# Patient Record
Sex: FEMALE | Race: WHITE | NOT HISPANIC OR LATINO | Employment: OTHER | URBAN - NONMETROPOLITAN AREA
[De-identification: names, ages, dates, MRNs, and addresses within clinical notes are randomized per-mention and may not be internally consistent; named-entity substitution may affect disease eponyms.]

---

## 2017-01-28 ENCOUNTER — TRANSCRIBE ORDERS (OUTPATIENT)
Dept: URGENT CARE | Facility: CLINIC | Age: 27
End: 2017-01-28

## 2017-01-28 DIAGNOSIS — B37.0 THRUSH, ORAL: ICD-10-CM

## 2017-01-30 ENCOUNTER — TRANSCRIBE ORDERS (OUTPATIENT)
Dept: URGENT CARE | Facility: CLINIC | Age: 27
End: 2017-01-30

## 2017-01-30 DIAGNOSIS — B37.0 THRUSH: Primary | ICD-10-CM

## 2017-04-25 PROCEDURE — 87102 FUNGUS ISOLATION CULTURE: CPT | Performed by: NURSE PRACTITIONER

## 2017-04-25 PROCEDURE — 87086 URINE CULTURE/COLONY COUNT: CPT | Performed by: NURSE PRACTITIONER

## 2017-04-25 PROCEDURE — 87220 TISSUE EXAM FOR FUNGI: CPT | Performed by: NURSE PRACTITIONER

## 2017-09-05 RX ORDER — NORGESTIMATE AND ETHINYL ESTRADIOL 0.25-0.035
1 KIT ORAL DAILY
Qty: 28 TABLET | Refills: 6 | Status: SHIPPED | OUTPATIENT
Start: 2017-09-05 | End: 2018-10-18

## 2017-09-11 RX ORDER — NORGESTIMATE AND ETHINYL ESTRADIOL 0.25-0.035
KIT ORAL
Qty: 28 TABLET | Refills: 11 | Status: SHIPPED | OUTPATIENT
Start: 2017-09-11 | End: 2018-03-22

## 2018-10-18 ENCOUNTER — PROCEDURE VISIT (OUTPATIENT)
Dept: OBSTETRICS AND GYNECOLOGY | Facility: CLINIC | Age: 28
End: 2018-10-18

## 2018-10-18 VITALS
BODY MASS INDEX: 21.29 KG/M2 | SYSTOLIC BLOOD PRESSURE: 129 MMHG | DIASTOLIC BLOOD PRESSURE: 75 MMHG | HEIGHT: 65 IN | WEIGHT: 127.8 LBS

## 2018-10-18 DIAGNOSIS — Z01.419 ENCOUNTER FOR GYNECOLOGICAL EXAMINATION WITHOUT ABNORMAL FINDING: Primary | ICD-10-CM

## 2018-10-18 DIAGNOSIS — Z30.41 SURVEILLANCE OF CONTRACEPTIVE PILL: ICD-10-CM

## 2018-10-18 DIAGNOSIS — F52.0 LACK OF LIBIDO: ICD-10-CM

## 2018-10-18 PROCEDURE — 99395 PREV VISIT EST AGE 18-39: CPT | Performed by: NURSE PRACTITIONER

## 2018-10-18 PROCEDURE — G0123 SCREEN CERV/VAG THIN LAYER: HCPCS | Performed by: NURSE PRACTITIONER

## 2018-10-18 RX ORDER — LEVONORGESTREL AND ETHINYL ESTRADIOL 0.1-0.02MG
1 KIT ORAL DAILY
Qty: 84 TABLET | Refills: 4 | Status: SHIPPED | OUTPATIENT
Start: 2018-10-18 | End: 2018-11-27 | Stop reason: ALTCHOICE

## 2018-10-18 NOTE — PROGRESS NOTES
Subjective   Jazmine Liang is a 28 y.o. Here for GYN exam and needs refills on OCPs. Has c/o zero sex drive since after she had her youngest son 3 years ago.     LMP- 3 weeks ago  Last pap- 3/16/15 no hx of abnormal  Not sure if her current OCP is the same as what she took prior to having children but wonders if it is the culprit of her lack of libido. States that she loves her  and is attracted to him but does admit that she has some normal marital resentment towards him because of household chores and caring for the children.      Gynecologic Exam   The patient's pertinent negatives include no genital itching, genital lesions, genital odor, genital rash, missed menses, pelvic pain, vaginal bleeding or vaginal discharge. Pertinent negatives include no abdominal pain, constipation, diarrhea, dysuria, headaches, nausea, rash, urgency or vomiting. She is sexually active. No, her partner does not have an STD. She uses oral contraceptives for contraception. Her menstrual history has been regular. There is no history of a  section, endometriosis, a gynecological surgery, menorrhagia, miscarriage, ovarian cysts, PID, an STD or a terminated pregnancy.       The following portions of the patient's history were reviewed and updated as appropriate: allergies, current medications, past family history, past medical history, past social history, past surgical history and problem list.    Review of Systems   Constitutional: Negative for activity change, appetite change, diaphoresis, fatigue and unexpected weight change.   Respiratory: Negative for chest tightness and shortness of breath.    Cardiovascular: Negative for chest pain, palpitations and leg swelling.   Gastrointestinal: Negative for abdominal distention, abdominal pain, blood in stool, constipation, diarrhea, nausea and vomiting.   Endocrine: Negative for cold intolerance, heat intolerance, polydipsia, polyphagia and polyuria.   Genitourinary: Negative  for difficulty urinating, dyspareunia, dysuria, genital sores, menorrhagia, menstrual problem, missed menses, pelvic pain, urgency, vaginal bleeding, vaginal discharge and vaginal pain.   Musculoskeletal: Negative for gait problem and myalgias.   Skin: Negative for color change, pallor and rash.   Neurological: Negative for dizziness, weakness, light-headedness and headaches.   Hematological: Negative for adenopathy.   Psychiatric/Behavioral: Negative for agitation, confusion, dysphoric mood, self-injury and suicidal ideas. The patient is not nervous/anxious.      Breast: no complaints    Objective   Physical Exam   Constitutional: She is oriented to person, place, and time. She appears well-developed and well-nourished.   Neck: No thyromegaly present.   Cardiovascular: Normal rate, regular rhythm, normal heart sounds and intact distal pulses.    Pulmonary/Chest: Effort normal and breath sounds normal. Right breast exhibits no inverted nipple, no mass, no nipple discharge, no skin change and no tenderness. Left breast exhibits no inverted nipple, no mass, no nipple discharge, no skin change and no tenderness. Breasts are symmetrical.   Abdominal: Soft. Bowel sounds are normal. She exhibits no distension. There is no tenderness.   Genitourinary: Vagina normal and uterus normal. No breast discharge or bleeding. There is no rash, tenderness, lesion or injury on the right labia. There is no rash, tenderness, lesion or injury on the left labia. Cervix exhibits no motion tenderness, no discharge and no friability. Right adnexum displays no mass, no tenderness and no fullness. Left adnexum displays no mass, no tenderness and no fullness.   Genitourinary Comments: Ectropian present. Pap smear obtained.    Lymphadenopathy:     She has no axillary adenopathy.        Right: No inguinal adenopathy present.        Left: No inguinal adenopathy present.   Neurological: She is alert and oriented to person, place, and time.   Skin:  Skin is warm, dry and intact.   Psychiatric: She has a normal mood and affect. Her speech is normal and behavior is normal.   Nursing note and vitals reviewed.        Assessment/Plan   Jazmine was seen today for gynecologic exam, contraception and decreased libido.    Diagnoses and all orders for this visit:    Encounter for Papanicolaou smear for cervical cancer screening  -     Liquid-based Pap Smear, Screening    Other orders  -     levonorgestrel-ethinyl estradiol (AVIANE,ALESSE,LESSINA) 0.1-20 MG-MCG per tablet; Take 1 tablet by mouth Daily.      Stop sprintec and start on Aviane. Encouraged her to speak with her  regarding sex/marital counseling, or even just start by reading the [employer] Love [employer] together. If OCP change is not helping with desire after abour 3-4 months she could try wellbutrin.

## 2018-11-01 LAB
GEN CATEG CVX/VAG CYTO-IMP: NORMAL
LAB AP CASE REPORT: NORMAL
LAB AP GYN ADDITIONAL INFORMATION: NORMAL
PATH INTERP SPEC-IMP: NORMAL
STAT OF ADQ CVX/VAG CYTO-IMP: NORMAL

## 2018-11-05 ENCOUNTER — TELEPHONE (OUTPATIENT)
Dept: OBSTETRICS AND GYNECOLOGY | Facility: CLINIC | Age: 28
End: 2018-11-05

## 2018-11-05 NOTE — TELEPHONE ENCOUNTER
----- Message from Leanna Parisi sent at 11/5/2018 11:26 AM CST -----  Contact: [phone]  Wanting pap results.

## 2018-11-27 ENCOUNTER — TELEPHONE (OUTPATIENT)
Dept: OBSTETRICS AND GYNECOLOGY | Facility: CLINIC | Age: 28
End: 2018-11-27

## 2018-11-27 RX ORDER — NORGESTIMATE AND ETHINYL ESTRADIOL 0.25-0.035
1 KIT ORAL DAILY
Qty: 28 TABLET | Refills: 12 | Status: SHIPPED | OUTPATIENT
Start: 2018-11-27 | End: 2019-11-15 | Stop reason: SDUPTHER

## 2018-11-27 NOTE — TELEPHONE ENCOUNTER
----- Message from Leanna Parisi sent at 11/26/2018  4:30 PM CST -----  Contact: [phone]  Pt said that she does not like new BC wants to change back to the old kind.  [facility]    Notified the pt that Manju Sidney d/c her current BC and changed her back to Mononessa, already sent to her pharmacy.

## 2019-06-13 PROBLEM — R53.83 FATIGUE: Status: ACTIVE | Noted: 2017-05-23

## 2019-06-13 PROBLEM — Z87.19: Status: ACTIVE | Noted: 2017-05-23

## 2019-08-01 NOTE — TELEPHONE ENCOUNTER
----- Message from Leanna Parisi sent at 11/5/2018 11:26 AM CST -----  Contact: [phone]  Wanting pap results.     Notified the pt of normal pap results.   Consent: Written consent was obtained and risks were reviewed including but not limited to scarring, infection, bleeding, scabbing, incomplete removal, nerve damage and allergy to anesthesia.

## 2019-11-15 ENCOUNTER — TELEPHONE (OUTPATIENT)
Dept: OBSTETRICS AND GYNECOLOGY | Facility: CLINIC | Age: 29
End: 2019-11-15

## 2019-11-15 RX ORDER — NORGESTIMATE AND ETHINYL ESTRADIOL 0.25-0.035
1 KIT ORAL DAILY
Qty: 28 TABLET | Refills: 12 | Status: SHIPPED | OUTPATIENT
Start: 2019-11-15 | End: 2019-11-18 | Stop reason: SDUPTHER

## 2019-11-18 ENCOUNTER — TELEPHONE (OUTPATIENT)
Dept: OBSTETRICS AND GYNECOLOGY | Facility: CLINIC | Age: 29
End: 2019-11-18

## 2019-11-18 RX ORDER — NORGESTIMATE AND ETHINYL ESTRADIOL 0.25-0.035
1 KIT ORAL DAILY
Qty: 28 TABLET | Refills: 12 | Status: SHIPPED | OUTPATIENT
Start: 2019-11-18 | End: 2020-10-15 | Stop reason: SDUPTHER

## 2020-04-25 PROBLEM — E16.2 HYPOGLYCEMIA: Status: ACTIVE | Noted: 2020-02-28

## 2020-10-15 ENCOUNTER — PROCEDURE VISIT (OUTPATIENT)
Dept: OBSTETRICS AND GYNECOLOGY | Facility: CLINIC | Age: 30
End: 2020-10-15

## 2020-10-15 VITALS
BODY MASS INDEX: 21.83 KG/M2 | HEIGHT: 65 IN | SYSTOLIC BLOOD PRESSURE: 98 MMHG | WEIGHT: 131 LBS | DIASTOLIC BLOOD PRESSURE: 60 MMHG

## 2020-10-15 DIAGNOSIS — Z30.41 SURVEILLANCE OF CONTRACEPTIVE PILL: Primary | ICD-10-CM

## 2020-10-15 PROCEDURE — 99213 OFFICE O/P EST LOW 20 MIN: CPT | Performed by: NURSE PRACTITIONER

## 2020-10-15 RX ORDER — NORGESTIMATE AND ETHINYL ESTRADIOL 0.25-0.035
1 KIT ORAL DAILY
Qty: 84 TABLET | Refills: 4 | Status: SHIPPED | OUTPATIENT
Start: 2020-10-15 | End: 2021-01-26

## 2020-10-15 NOTE — PROGRESS NOTES
Shirley Liang is a 30 y.o. presents for birth control refills. No concerns at this time.    LMP- 10/13  Last pap- 10/18/18 NIL    Gynecologic Exam  The patient's pertinent negatives include no genital itching, genital lesions, genital odor, genital rash, missed menses, pelvic pain, vaginal bleeding or vaginal discharge. Pertinent negatives include no abdominal pain, constipation, diarrhea or dysuria. She is sexually active. No, her partner does not have an STD. She uses oral contraceptives for contraception. Her menstrual history has been regular.       The following portions of the patient's history were reviewed and updated as appropriate: allergies, current medications, past family history, past medical history, past social history, past surgical history and problem list.    Review of Systems   Constitutional: Negative for activity change, appetite change, diaphoresis, fatigue, unexpected weight gain and unexpected weight loss.   Respiratory: Negative for chest tightness and shortness of breath.    Cardiovascular: Negative for chest pain and palpitations.   Gastrointestinal: Negative for abdominal distention, abdominal pain, constipation and diarrhea.   Endocrine: Negative.    Genitourinary: Negative for amenorrhea, breast discharge, breast lump, breast pain, decreased libido, dyspareunia, dysuria, menstrual problem, missed menses, pelvic pain, vaginal bleeding, vaginal discharge and vaginal pain.   Musculoskeletal: Negative for myalgias.   Skin: Negative for color change, dry skin and skin lesions.   Neurological: Negative for light-headedness and headache.   Psychiatric/Behavioral: Negative for agitation, dysphoric mood, sleep disturbance, depressed mood and stress. The patient is not nervous/anxious.        Objective   Physical Exam  Vitals signs and nursing note reviewed.   Constitutional:       General: She is awake. She is not in acute distress.     Appearance: Normal appearance. She is  well-developed and normal weight. She is not ill-appearing, toxic-appearing or diaphoretic.   Cardiovascular:      Rate and Rhythm: Normal rate and regular rhythm.      Heart sounds: Normal heart sounds.   Pulmonary:      Effort: Pulmonary effort is normal.      Breath sounds: Normal breath sounds.   Genitourinary:     Comments: Pelvic exam deferred.  Skin:     General: Skin is warm and dry.   Neurological:      Mental Status: She is alert and oriented to person, place, and time.   Psychiatric:         Behavior: Behavior normal. Behavior is cooperative.           Assessment/Plan   Diagnoses and all orders for this visit:    1. Surveillance of contraceptive pill (Primary)    Other orders  -     norgestimate-ethinyl estradiol (ORTHO-CYCLEN) 0.25-35 MG-MCG per tablet; Take 1 tablet by mouth Daily.  Dispense: 84 tablet; Refill: 4      RBA and potential s/e of OCPs reviewed.

## 2021-01-26 ENCOUNTER — OFFICE VISIT (OUTPATIENT)
Dept: OBSTETRICS AND GYNECOLOGY | Facility: CLINIC | Age: 31
End: 2021-01-26

## 2021-01-26 VITALS
SYSTOLIC BLOOD PRESSURE: 120 MMHG | DIASTOLIC BLOOD PRESSURE: 72 MMHG | BODY MASS INDEX: 21.83 KG/M2 | WEIGHT: 131 LBS | HEIGHT: 65 IN

## 2021-01-26 DIAGNOSIS — N89.8 VAGINAL DISCHARGE: Primary | ICD-10-CM

## 2021-01-26 DIAGNOSIS — Z30.41 SURVEILLANCE OF CONTRACEPTIVE PILL: ICD-10-CM

## 2021-01-26 PROCEDURE — 99214 OFFICE O/P EST MOD 30 MIN: CPT | Performed by: NURSE PRACTITIONER

## 2021-01-26 RX ORDER — LEVONORGESTREL AND ETHINYL ESTRADIOL 0.1-0.02MG
1 KIT ORAL DAILY
Qty: 84 TABLET | Refills: 4 | Status: SHIPPED | OUTPATIENT
Start: 2021-01-26 | End: 2021-09-30

## 2021-01-26 NOTE — PROGRESS NOTES
Subjective   Chief Complaint   Patient presents with   • Vaginal Discharge     Jazmine Liang is a 30 y.o. year old  presenting to be seen for evaluation of an abnormal vaginal discharge. The discharge is mucousy and bloody.  Her symptoms have been present for 3 month(s).  Additional she has noticed no other symptoms. Denies dysuria, vaginal pain or swelling, itching, burning, odor or painful intercourse..    She is sexually active.  In the past 12 months there has not been new sexual partners.  Condoms are not typically used.  She would like to be screened for STD's at today's exam.     Prior to the onset of symptoms she was not on systemic antibiotics.  She has not recently changed soaps/detergents/toilet tissue.  Prior to this visit, she has used nothing in an attempt to improve her symptoms.    Patient's last menstrual period was 2020 (approximate).    Current birth control method: OCP (estrogen/progesterone). Switched from Aviane to Sprintec about 2 years ago because her sex drive was a bit lower and she was nash. States that the Sprintec didn't really help her sex drive but it did improve her mood. Considering switching back just to help with libido.    No Additional Complaints Reported    The following portions of the patient's history were reviewed and updated as appropriate:problem list, current medications and allergies    Review of Systems   Constitutional: Negative for activity change, appetite change, chills, diaphoresis, fatigue, fever, unexpected weight gain and unexpected weight loss.   Respiratory: Negative for chest tightness and shortness of breath.    Cardiovascular: Negative for chest pain and palpitations.   Endocrine: Negative.    Genitourinary: Positive for vaginal discharge. Negative for amenorrhea, breast discharge, breast lump, breast pain, decreased libido, difficulty urinating, dyspareunia, dysuria, frequency, menstrual problem, pelvic pain, pelvic pressure, urgency,  "urinary incontinence, vaginal bleeding and vaginal pain.   Skin: Negative for color change, dry skin and skin lesions.   Neurological: Negative for light-headedness and headache.   Psychiatric/Behavioral: Negative for agitation, dysphoric mood, sleep disturbance, depressed mood and stress. The patient is not nervous/anxious.         Objective   /72   Ht 165.1 cm (65\")   Wt 59.4 kg (131 lb)   LMP 12/29/2020 (Approximate)   Breastfeeding No   BMI 21.80 kg/m²     General:  well developed; well nourished  no acute distress  appears stated age   Skin:  Not performed.   Pelvis: Clinical staff was present for exam  External genitalia:  normal appearance of the external genitalia including Bartholin's and [place]'s glands.  :  urethral meatus normal; urethral hypermobility is absent.  Vaginal:  normal pink mucosa without prolapse or lesions. discharge present -  mucousy and white;  Cervix:  normal appearance. friable; cervical motion tenderness is absent; ectropian present;     Lab Review   Last pap- 10/2018 NIL    Imaging   No data reviewed         Diagnoses and all orders for this visit:    Vaginal discharge  -     OneSwab - Kit, Vagina; Future    Surveillance of contraceptive pill    Other orders  -     levonorgestrel-ethinyl estradiol (AVIANE,ALESSE,LESSINA) 0.1-20 MG-MCG per tablet; Take 1 tablet by mouth Daily.        New Medications Ordered This Visit   Medications   • levonorgestrel-ethinyl estradiol (AVIANE,ALESSE,LESSINA) 0.1-20 MG-MCG per tablet     Sig: Take 1 tablet by mouth Daily.     Dispense:  84 tablet     Refill:  4     One swab collected; will call with results when available. Stop Sprintec and restart on Aviane; RBA and potential s/e reviewed. RTC for pap when due.    This note was electronically signed.    Manju Little, CLAU  January 26, 2021  "

## 2021-02-02 RX ORDER — AMOXICILLIN AND CLAVULANATE POTASSIUM 875; 125 MG/1; MG/1
1 TABLET, FILM COATED ORAL EVERY 12 HOURS
Qty: 14 TABLET | Refills: 0 | Status: SHIPPED | OUTPATIENT
Start: 2021-02-02 | End: 2021-02-09

## 2021-02-02 RX ORDER — FLUCONAZOLE 150 MG/1
TABLET ORAL
Qty: 2 TABLET | Refills: 0 | OUTPATIENT
Start: 2021-02-02 | End: 2021-03-01

## 2021-02-03 DIAGNOSIS — N89.8 VAGINAL DISCHARGE: ICD-10-CM

## 2021-03-11 PROCEDURE — 87086 URINE CULTURE/COLONY COUNT: CPT | Performed by: NURSE PRACTITIONER

## 2021-03-16 PROCEDURE — 87086 URINE CULTURE/COLONY COUNT: CPT | Performed by: NURSE PRACTITIONER

## 2021-03-16 PROCEDURE — 87147 CULTURE TYPE IMMUNOLOGIC: CPT | Performed by: NURSE PRACTITIONER

## 2021-03-17 ENCOUNTER — TRANSCRIBE ORDERS (OUTPATIENT)
Dept: URGENT CARE | Facility: CLINIC | Age: 31
End: 2021-03-17

## 2021-03-17 DIAGNOSIS — N39.0 ACUTE UTI: Primary | ICD-10-CM

## 2021-03-17 RX ORDER — PENICILLIN V POTASSIUM 500 MG/1
500 TABLET ORAL 3 TIMES DAILY
Qty: 30 TABLET | Refills: 0 | Status: SHIPPED | OUTPATIENT
Start: 2021-03-17 | End: 2021-03-27

## 2021-09-30 ENCOUNTER — OFFICE VISIT (OUTPATIENT)
Dept: OBSTETRICS AND GYNECOLOGY | Facility: CLINIC | Age: 31
End: 2021-09-30

## 2021-09-30 VITALS
HEIGHT: 65 IN | BODY MASS INDEX: 22.99 KG/M2 | WEIGHT: 138 LBS | SYSTOLIC BLOOD PRESSURE: 118 MMHG | DIASTOLIC BLOOD PRESSURE: 70 MMHG

## 2021-09-30 DIAGNOSIS — N92.1 BREAKTHROUGH BLEEDING ON BIRTH CONTROL PILLS: Primary | ICD-10-CM

## 2021-09-30 DIAGNOSIS — F52.0 LACK OF LIBIDO: ICD-10-CM

## 2021-09-30 DIAGNOSIS — Z30.41 SURVEILLANCE OF CONTRACEPTIVE PILL: ICD-10-CM

## 2021-09-30 DIAGNOSIS — R10.2 PELVIC PAIN: ICD-10-CM

## 2021-09-30 PROCEDURE — 99213 OFFICE O/P EST LOW 20 MIN: CPT | Performed by: NURSE PRACTITIONER

## 2021-09-30 RX ORDER — NORETHINDRONE ACETATE AND ETHINYL ESTRADIOL AND FERROUS FUMARATE 5-7-9-7
1 KIT ORAL DAILY
Qty: 28 TABLET | Refills: 12 | Status: SHIPPED | OUTPATIENT
Start: 2021-09-30 | End: 2022-03-14 | Stop reason: HOSPADM

## 2021-09-30 NOTE — PROGRESS NOTES
Subjective   Jazmine Liang is a 31 y.o. here for pelvic pain and irregular bleeding.     Patient reports she is having left lower pelvic pain that radiates to her left lower back and her left abdomen. The pain happens 2 weeks after her 7 day long period, and has been occurring monthly for one year. She also has bleeding with the pain that lasts three days, and she fills a pantyliner once a day for those 3 days. She states that she has also had low sex drive for 2-3 years. She is currently taking Aviane. She has tried to treat the pain with ibuprofen 400 mg with little relief. No other symptoms at this time.     LMP: 9/1/21    Menstrual Problem  This is a recurrent problem. The current episode started more than 1 month ago. The problem occurs intermittently. The problem has been unchanged. Pertinent negatives include no abdominal pain, chest pain, diaphoresis, fatigue or myalgias. Nothing aggravates the symptoms. She has tried NSAIDs for the symptoms. The treatment provided mild relief.   Pelvic Pain  The patient's primary symptoms include pelvic pain. The patient's pertinent negatives include no vaginal discharge. Pertinent negatives include no abdominal pain, constipation, diarrhea or dysuria.       The following portions of the patient's history were reviewed and updated as appropriate: allergies, current medications, past family history, past medical history, past social history, past surgical history and problem list.    Review of Systems   Constitutional: Negative for activity change, appetite change, diaphoresis, fatigue, unexpected weight gain and unexpected weight loss.   Respiratory: Negative for chest tightness and shortness of breath.    Cardiovascular: Negative for chest pain and palpitations.   Gastrointestinal: Negative for abdominal distention, abdominal pain, constipation and diarrhea.   Genitourinary: Positive for decreased libido, menstrual problem, pelvic pain and vaginal bleeding. Negative for  amenorrhea, breast discharge, breast lump, breast pain, dyspareunia, dysuria, vaginal discharge and vaginal pain.   Musculoskeletal: Negative for myalgias.   Skin: Negative for color change, dry skin and skin lesions.   Neurological: Negative for light-headedness and headache.   Psychiatric/Behavioral: Negative for agitation, dysphoric mood, sleep disturbance, depressed mood and stress. The patient is not nervous/anxious.        Objective   Physical Exam  Vitals and nursing note reviewed. Exam conducted with a chaperone present.   Constitutional:       General: She is awake. She is not in acute distress.     Appearance: Normal appearance. She is well-developed and well-groomed. She is not ill-appearing, toxic-appearing or diaphoretic.   Neck:      Thyroid: No thyroid mass, thyromegaly or thyroid tenderness.   Cardiovascular:      Rate and Rhythm: Normal rate and regular rhythm.      Heart sounds: Normal heart sounds.   Pulmonary:      Effort: Pulmonary effort is normal.      Breath sounds: Normal breath sounds.   Abdominal:      General: Bowel sounds are normal. There is no distension.      Palpations: Abdomen is soft.      Tenderness: There is abdominal tenderness in the left lower quadrant. There is no right CVA tenderness, left CVA tenderness or guarding. Negative signs include McBurney's sign.      Hernia: There is no hernia in the left inguinal area.   Genitourinary:     General: Normal vulva.      Exam position: Lithotomy position.      Vasyl stage (genital): 5.      Labia:         Right: No rash, tenderness, lesion or injury.         Left: Tenderness present. No rash, lesion or injury.       Urethra: No prolapse, urethral pain, urethral swelling or urethral lesion.      Vagina: Normal.      Cervix: Normal.      Uterus: Normal.       Adnexa: Right adnexa normal and left adnexa normal.        Right: No mass, tenderness or fullness.          Left: No mass, tenderness or fullness.        Comments: Bimanual exam:  no ovaries felt, uterus midline, non-tender.   Lymphadenopathy:      Lower Body: No right inguinal adenopathy. No left inguinal adenopathy.   Skin:     General: Skin is warm and dry.   Neurological:      Mental Status: She is alert and oriented to person, place, and time.      Gait: Gait is intact.   Psychiatric:         Attention and Perception: Attention and perception normal.         Mood and Affect: Mood and affect normal.         Speech: Speech normal.         Behavior: Behavior normal. Behavior is cooperative.           Assessment/Plan   Diagnoses and all orders for this visit:    1. Breakthrough bleeding on birth control pills (Primary)    2. Surveillance of contraceptive pill    3. Lack of libido    4. Pelvic pain    Other orders  -     norethindrone-ethinyl estradiol-iron (ESTROSTEP FE) 1-20/1-30/1-35 MG-MCG tablet; Take 1 tablet by mouth Daily.  Dispense: 28 tablet; Refill: 12    Education on new contraception provided. RTC in 3 months if symptoms do not improve or worsen.

## 2022-03-14 ENCOUNTER — LAB (OUTPATIENT)
Dept: LAB | Facility: HOSPITAL | Age: 32
End: 2022-03-14

## 2022-03-14 ENCOUNTER — INITIAL PRENATAL (OUTPATIENT)
Dept: OBSTETRICS AND GYNECOLOGY | Facility: CLINIC | Age: 32
End: 2022-03-14

## 2022-03-14 VITALS — BODY MASS INDEX: 22.63 KG/M2 | WEIGHT: 136 LBS | DIASTOLIC BLOOD PRESSURE: 68 MMHG | SYSTOLIC BLOOD PRESSURE: 110 MMHG

## 2022-03-14 DIAGNOSIS — R76.8 ANTI-DUFFY ANTIBODIES PRESENT: Primary | ICD-10-CM

## 2022-03-14 DIAGNOSIS — Z36.87 UNSURE OF LMP (LAST MENSTRUAL PERIOD) AS REASON FOR ULTRASOUND SCAN: ICD-10-CM

## 2022-03-14 DIAGNOSIS — Z32.00 PREGNANCY EXAMINATION OR TEST, PREGNANCY UNCONFIRMED: ICD-10-CM

## 2022-03-14 DIAGNOSIS — Z34.80 SUPERVISION OF OTHER NORMAL PREGNANCY: Primary | ICD-10-CM

## 2022-03-14 DIAGNOSIS — Z32.01 POSITIVE PREGNANCY TEST: ICD-10-CM

## 2022-03-14 DIAGNOSIS — O36.80X0 ENCOUNTER TO DETERMINE FETAL VIABILITY OF PREGNANCY, SINGLE OR UNSPECIFIED FETUS: ICD-10-CM

## 2022-03-14 LAB
A AB TITR SERPL: NORMAL {TITER}
A AB TITR SERPL: NORMAL {TITER}
ABO GROUP BLD: NORMAL
AMPHET+METHAMPHET UR QL: NEGATIVE
AMPHETAMINES UR QL: NEGATIVE
ANTI-E: NORMAL
ANTI-FYB: NORMAL
B-HCG UR QL: POSITIVE
BARBITURATES UR QL SCN: NEGATIVE
BASOPHILS # BLD AUTO: 0.02 10*3/MM3 (ref 0–0.2)
BASOPHILS NFR BLD AUTO: 0.3 % (ref 0–1.5)
BENZODIAZ UR QL SCN: NEGATIVE
BILIRUB UR QL STRIP: NEGATIVE
BLD GP AB SCN SERPL QL: POSITIVE
BUPRENORPHINE SERPL-MCNC: NEGATIVE NG/ML
CANNABINOIDS SERPL QL: NEGATIVE
CLARITY UR: ABNORMAL
COCAINE UR QL: NEGATIVE
COLOR UR: YELLOW
DEPRECATED RDW RBC AUTO: 42.6 FL (ref 37–54)
EOSINOPHIL # BLD AUTO: 0.02 10*3/MM3 (ref 0–0.4)
EOSINOPHIL NFR BLD AUTO: 0.3 % (ref 0.3–6.2)
ERYTHROCYTE [DISTWIDTH] IN BLOOD BY AUTOMATED COUNT: 12.6 % (ref 12.3–15.4)
EXPIRATION DATE: ABNORMAL
GLUCOSE UR STRIP-MCNC: NEGATIVE MG/DL
HBV SURFACE AG SERPL QL IA: NORMAL
HCG INTACT+B SERPL-ACNC: NORMAL MIU/ML
HCT VFR BLD AUTO: 39.9 % (ref 34–46.6)
HCV AB SER DONR QL: NORMAL
HGB BLD-MCNC: 13.6 G/DL (ref 12–15.9)
HGB UR QL STRIP.AUTO: ABNORMAL
HIV1+2 AB SER QL: NORMAL
IMM GRANULOCYTES # BLD AUTO: 0.02 10*3/MM3 (ref 0–0.05)
IMM GRANULOCYTES NFR BLD AUTO: 0.3 % (ref 0–0.5)
INTERNAL NEGATIVE CONTROL: NEGATIVE
INTERNAL POSITIVE CONTROL: POSITIVE
KETONES UR QL STRIP: NEGATIVE
LEUKOCYTE ESTERASE UR QL STRIP.AUTO: ABNORMAL
LYMPHOCYTES # BLD AUTO: 1.24 10*3/MM3 (ref 0.7–3.1)
LYMPHOCYTES NFR BLD AUTO: 16.6 % (ref 19.6–45.3)
Lab: ABNORMAL
Lab: NORMAL
MCH RBC QN AUTO: 31.5 PG (ref 26.6–33)
MCHC RBC AUTO-ENTMCNC: 34.1 G/DL (ref 31.5–35.7)
MCV RBC AUTO: 92.4 FL (ref 79–97)
METHADONE UR QL SCN: NEGATIVE
MONOCYTES # BLD AUTO: 0.54 10*3/MM3 (ref 0.1–0.9)
MONOCYTES NFR BLD AUTO: 7.2 % (ref 5–12)
NEUTROPHILS NFR BLD AUTO: 5.65 10*3/MM3 (ref 1.7–7)
NEUTROPHILS NFR BLD AUTO: 75.3 % (ref 42.7–76)
NITRITE UR QL STRIP: NEGATIVE
NRBC BLD AUTO-RTO: 0 /100 WBC (ref 0–0.2)
OPIATES UR QL: NEGATIVE
OXYCODONE UR QL SCN: NEGATIVE
PCP UR QL SCN: NEGATIVE
PH UR STRIP.AUTO: 6.5 [PH] (ref 5–8)
PLATELET # BLD AUTO: 256 10*3/MM3 (ref 140–450)
PMV BLD AUTO: 9.4 FL (ref 6–12)
PROPOXYPH UR QL: NEGATIVE
PROT UR QL STRIP: ABNORMAL
RBC # BLD AUTO: 4.32 10*6/MM3 (ref 3.77–5.28)
RH BLD: POSITIVE
RPR SER QL: NORMAL
SP GR UR STRIP: 1.03 (ref 1–1.03)
TRICYCLICS UR QL SCN: NEGATIVE
UROBILINOGEN UR QL STRIP: ABNORMAL
WBC NRBC COR # BLD: 7.49 10*3/MM3 (ref 3.4–10.8)

## 2022-03-14 PROCEDURE — 36415 COLL VENOUS BLD VENIPUNCTURE: CPT

## 2022-03-14 PROCEDURE — 87086 URINE CULTURE/COLONY COUNT: CPT | Performed by: OBSTETRICS & GYNECOLOGY

## 2022-03-14 PROCEDURE — 87147 CULTURE TYPE IMMUNOLOGIC: CPT | Performed by: OBSTETRICS & GYNECOLOGY

## 2022-03-14 PROCEDURE — 86886 COOMBS TEST INDIRECT TITER: CPT | Performed by: OBSTETRICS & GYNECOLOGY

## 2022-03-14 PROCEDURE — 86870 RBC ANTIBODY IDENTIFICATION: CPT | Performed by: OBSTETRICS & GYNECOLOGY

## 2022-03-14 PROCEDURE — 80306 DRUG TEST PRSMV INSTRMNT: CPT | Performed by: OBSTETRICS & GYNECOLOGY

## 2022-03-14 PROCEDURE — 81003 URINALYSIS AUTO W/O SCOPE: CPT | Performed by: OBSTETRICS & GYNECOLOGY

## 2022-03-14 PROCEDURE — 87491 CHLMYD TRACH DNA AMP PROBE: CPT | Performed by: OBSTETRICS & GYNECOLOGY

## 2022-03-14 PROCEDURE — 86803 HEPATITIS C AB TEST: CPT | Performed by: OBSTETRICS & GYNECOLOGY

## 2022-03-14 PROCEDURE — 81025 URINE PREGNANCY TEST: CPT | Performed by: OBSTETRICS & GYNECOLOGY

## 2022-03-14 PROCEDURE — 86905 BLOOD TYPING RBC ANTIGENS: CPT

## 2022-03-14 PROCEDURE — 87591 N.GONORRHOEAE DNA AMP PROB: CPT | Performed by: OBSTETRICS & GYNECOLOGY

## 2022-03-14 PROCEDURE — 80081 OBSTETRIC PANEL INC HIV TSTG: CPT | Performed by: OBSTETRICS & GYNECOLOGY

## 2022-03-14 PROCEDURE — 87661 TRICHOMONAS VAGINALIS AMPLIF: CPT | Performed by: OBSTETRICS & GYNECOLOGY

## 2022-03-14 PROCEDURE — 84702 CHORIONIC GONADOTROPIN TEST: CPT | Performed by: OBSTETRICS & GYNECOLOGY

## 2022-03-14 RX ORDER — ONDANSETRON 4 MG/1
4 TABLET, ORALLY DISINTEGRATING ORAL EVERY 8 HOURS PRN
Qty: 30 TABLET | Refills: 3 | Status: SHIPPED | OUTPATIENT
Start: 2022-03-14 | End: 2022-08-18

## 2022-03-14 RX ORDER — PRENATAL VIT NO.126/IRON/FOLIC 28MG-0.8MG
TABLET ORAL DAILY
COMMUNITY
End: 2022-12-12

## 2022-03-14 NOTE — PROGRESS NOTES
I spent approximately 40 minutes with the patient acquiring the health and history intake and discussing topics related to healthy lifestyle. Her UPT is positive in the office today.  Her LMP is approximately 1/24/22. This is her 3rd pregnancy. She had a vaginal delivery with her son on 9/25/13 at 39 weeks and 6 days. He was delivered by Dr. Mayo. Her second son was delivered by Dr. Sykes on 10/29/15. The pregnancy was complicated by GBS positive and multiple maternal antibodies (most significant being E and FYB according to the delivery note).   She has history of blood transfusion, postpartum hemorrhage, and postpartum depression. She denies any health problems. She filled out the depression screening questionnaire and scored 2. She denies any use of tobacco, alcohol, or drug use. She is having problems with nausea and vomiting. We discussed her trying Vitamin B6 and Unisom. A prescription for Zofran is also sent to her pharmacy.   A newob bag is given. The 1st trimester teaching was done with the patient. We discussed a healthy diet and exercise and what is recommended. She is taking a prenatal vitamin. We also discussed Listeriosis and Toxoplasmosis. She does not eat fish. I informed patient not to be in hot tubs, saunas, or tanning beds. I encouraged her to make an appointment with the dentist if she has not had a dental exam and cleaning in the last 6 months. She had a dental appointment about 3 months ago. She plans to formula feed. I encouraged the patient to get the TDAP vaccine in the 3rd trimester.  I discussed with the patient that a pediatrician needs to be chosen prior to delivery for the infant to have an appointment scheduled before leaving the hospital. Her children see Radha OLGUIN. I discussed lab tests will be done today. Her last pap smear on file is negative on 10/18/18.  All questions were answered at this time. She is scheduled for an ultrasound and to see Delmi OLGUIN  on 3/23/22.

## 2022-03-15 ENCOUNTER — TELEPHONE (OUTPATIENT)
Dept: OBSTETRICS AND GYNECOLOGY | Facility: CLINIC | Age: 32
End: 2022-03-15

## 2022-03-15 LAB
BACTERIA SPEC AEROBE CULT: ABNORMAL
C TRACH RRNA CVX QL NAA+PROBE: NEGATIVE
N GONORRHOEA RRNA SPEC QL NAA+PROBE: NEGATIVE
RUBV IGG SERPL IA-ACNC: 4.87 INDEX
TRICHOMONAS VAGINALIS PCR: NEGATIVE

## 2022-03-15 RX ORDER — NITROFURANTOIN 25; 75 MG/1; MG/1
100 CAPSULE ORAL 2 TIMES DAILY
Qty: 14 CAPSULE | Refills: 0 | Status: SHIPPED | OUTPATIENT
Start: 2022-03-15 | End: 2022-03-23

## 2022-03-15 NOTE — TELEPHONE ENCOUNTER
----- Message from Diana Obrien MD sent at 3/15/2022  1:05 PM CDT -----  Please let her know that urine culture shows GBS. Will treat now with ABX and will need ABX in labor. Since allergic to cephalosporins, will do Macrobid.

## 2022-03-17 ENCOUNTER — TELEPHONE (OUTPATIENT)
Dept: OBSTETRICS AND GYNECOLOGY | Facility: CLINIC | Age: 32
End: 2022-03-17

## 2022-03-17 NOTE — TELEPHONE ENCOUNTER
Called and spoke with patient regarding results and dr vasquez recommendation for referral to [facility].    Patient verbalized understanding and did not have any questions or concerns.  Faxing patient information and referral

## 2022-03-17 NOTE — TELEPHONE ENCOUNTER
----- Message from Diana Obrien MD sent at 3/17/2022  9:40 AM CDT -----  Please let her know that she has antibodies just like her last pregnancy but they higher. Recommend MFM consult at 18 weeks w/ anatomy US and starting weekly US to check the blood flow since the baby can become anemic because of the antibodies and we can check for that by checking the blood flow in the baby's brain. Please place referral to [facility] in [place] for 18 weeks w/ anatomy US and MCA dopplers.

## 2022-03-23 ENCOUNTER — INITIAL PRENATAL (OUTPATIENT)
Dept: OBSTETRICS AND GYNECOLOGY | Facility: CLINIC | Age: 32
End: 2022-03-23

## 2022-03-23 VITALS — DIASTOLIC BLOOD PRESSURE: 66 MMHG | WEIGHT: 135 LBS | SYSTOLIC BLOOD PRESSURE: 112 MMHG | BODY MASS INDEX: 22.47 KG/M2

## 2022-03-23 DIAGNOSIS — O23.41 GBS (GROUP B STREPTOCOCCUS) UTI COMPLICATING PREGNANCY, FIRST TRIMESTER: ICD-10-CM

## 2022-03-23 DIAGNOSIS — O21.9 NAUSEA AND VOMITING DURING PREGNANCY PRIOR TO 22 WEEKS GESTATION: ICD-10-CM

## 2022-03-23 DIAGNOSIS — O36.0911 RH ALLOIMMUNIZATION, MATERNAL, ANTEPARTUM, FIRST TRIMESTER, FETUS 1: ICD-10-CM

## 2022-03-23 DIAGNOSIS — O09.91 HIGH-RISK PREGNANCY IN FIRST TRIMESTER: ICD-10-CM

## 2022-03-23 DIAGNOSIS — Z3A.09 9 WEEKS GESTATION OF PREGNANCY: Primary | ICD-10-CM

## 2022-03-23 DIAGNOSIS — B95.1 GBS (GROUP B STREPTOCOCCUS) UTI COMPLICATING PREGNANCY, FIRST TRIMESTER: ICD-10-CM

## 2022-03-23 PROBLEM — Z30.41 SURVEILLANCE OF CONTRACEPTIVE PILL: Status: RESOLVED | Noted: 2018-10-18 | Resolved: 2022-03-23

## 2022-03-23 PROBLEM — Z87.19: Status: RESOLVED | Noted: 2017-05-23 | Resolved: 2022-03-23

## 2022-03-23 PROBLEM — R53.83 FATIGUE: Status: RESOLVED | Noted: 2017-05-23 | Resolved: 2022-03-23

## 2022-03-23 PROBLEM — N92.1 BREAKTHROUGH BLEEDING ON BIRTH CONTROL PILLS: Status: RESOLVED | Noted: 2021-09-30 | Resolved: 2022-03-23

## 2022-03-23 PROBLEM — F52.0 LACK OF LIBIDO: Status: RESOLVED | Noted: 2018-10-18 | Resolved: 2022-03-23

## 2022-03-23 PROCEDURE — 0501F PRENATAL FLOW SHEET: CPT | Performed by: NURSE PRACTITIONER

## 2022-03-23 NOTE — PROGRESS NOTES
[facility]  Obstetrics  Date of Service: 2022    CHIEF COMPLAINT:  New prenatal visit    HISTORY OF PRESENT ILLNESS:  Jazmine Liang is a 31 y.o. y/o  at 9w0d by LMP (Patient's last menstrual period was 2022 (approximate).  The patient is supported by her  today.  Reports  nausea with occasional vomiting.   She denies any vaginal bleeding.  She has started taking a prenatal gummy vitamin.    REVIEW OF SYSTEMS  Review of Systems   Constitutional: Negative for activity change, appetite change, diaphoresis, fatigue, unexpected weight gain and unexpected weight loss.   Respiratory: Negative for chest tightness and shortness of breath.    Cardiovascular: Negative for chest pain and palpitations.   Gastrointestinal: Positive for nausea and vomiting. Negative for abdominal distention, abdominal pain, constipation and diarrhea.   Genitourinary: Negative for amenorrhea, breast discharge, breast lump, breast pain, decreased libido, decreased urine volume, difficulty urinating, dyspareunia, dysuria, flank pain, frequency, genital sores, hematuria, pelvic pain, pelvic pressure, urgency, urinary incontinence, vaginal bleeding, vaginal discharge and vaginal pain.   Musculoskeletal: Negative for myalgias.   Skin: Negative for color change, dry skin and skin lesions.   Neurological: Negative for light-headedness and headache.   Psychiatric/Behavioral: Negative for agitation, dysphoric mood, sleep disturbance, suicidal ideas, depressed mood and stress. The patient is not nervous/anxious.        PRENATAL RISK FACTORS  3/22 Problems (from 22 to present)     Problem Noted Resolved    Nausea and vomiting during pregnancy prior to 22 weeks gestation 3/23/2022 by Delmi Bynum APRN No          DATING CRITERIA:  LMP end of January around 2022  1TUS (2022 at 9w0d) -- SP 10/26/2022    OBSTETRIC HISTORY:  OB History    Para Term  AB Living    3 2 2     2   SAB IAB Ectopic Molar Multiple Live Births             2      # Outcome Date GA Lbr Berry/2nd Weight Sex Delivery Anes PTL Lv   3 Current            2 Term 10/29/15 38w4d  3572 g (7 lb 14 oz) M Vag-Spont EPI N SULEMAN      Birth Comments: also complicated by multiple maternal antibodies; most significant being E and FYB      Complications: Postpartum hemorrhage, Positive GBS test   1 Term 13 39w6d  3771 g (8 lb 5 oz) M Vag-Spont EPI N SULEMAN     GYN HISTORY:  Denies h/o sexually transmitted infections/pelvic inflammatory disease  Denies h/o abnormal pap smears  Last pap smear:   Last Completed Pap Smear     This patient has no relevant Health Maintenance data.        Denies h/o gynecologic surgeries, including biopsies of the cervix    PAST MEDICAL HISTORY:  Past Medical History:   Diagnosis Date   • Cellulitis     LLE   • Encounter for  visit      care status/  visit status   • General medical exam     general exam of patient   • History of postpartum depression    • History of transfusion    • Maternal care for rhesus isoimmunization, third trimester, delivered     maternal care for other isoimmunization, third triemster, not applicable or unspecified   • Nausea and vomiting    • Primigravida    • Vaginal irritation     swab sent for probable yeast infection   • Varicella      PAST SURGICAL HISTORY:  Past Surgical History:   Procedure Laterality Date   • TONSILLECTOMY     • WISDOM TOOTH EXTRACTION       FAMILY HISTORY:  Family History   Problem Relation Age of Onset   • No Known Problems Father    • No Known Problems Mother    • No Known Problems Brother    • No Known Problems Son    • No Known Problems Son    • Hypertension Paternal Grandfather    • Hypertension Paternal Grandmother    • Diabetes Paternal Grandmother    • Pancreatic cancer Paternal Grandmother    • No Known Problems Maternal Grandmother    • No Known Problems Maternal Grandfather    • Diabetes Other    •  Hypertension Other      SOCIAL HISTORY:  Social History     Socioeconomic History   • Marital status:    Tobacco Use   • Smoking status: Never Smoker   • Smokeless tobacco: Never Used   Vaping Use   • Vaping Use: Never used   Substance and Sexual Activity   • Alcohol use: No   • Drug use: No   • Sexual activity: Yes     Partners: Male     Comment: last pap smear 10/18/18 negative     GENETIC SCREENING:  Age >36 yo as of SP: no  Thalassemia: no  NTD: no  CHD: no  Down Syndrome/MR/Fragile X/Autism: FOB 2nd cousin  Ashkenazi Mandaen with Saul-Sachs, [place], familial dysautonomia: no  Sickle cell disease or trait: no  Hemophilia: no  Muscular dystrophy: no  Cystic fibrosis: no  Dwarf's chorea: no  Birth defects: no  Genetic/chromosomal disorders: no    INFECTION HISTORY:  TB exposure: no  HSV: no  Illness since LMP: no  Prior GBS infected child: no  STIs: no    ALLERGIES:  Allergies   Allergen Reactions   • Bactrim [Sulfamethoxazole-Trimethoprim] Rash   • Ceclor [Cefaclor] Rash       MEDICATIONS:  Prior to Admission medications    Medication Sig Start Date End Date Taking? Authorizing Provider   prenatal vitamin (prenatal, CLASSIC, vitamin) tablet Take  by mouth Daily.   Yes Provider, MD Bull   ondansetron ODT (Zofran ODT) 4 MG disintegrating tablet Place 1 tablet on the tongue Every 8 (Eight) Hours As Needed for Nausea or Vomiting. 3/14/22   Diana Obrien MD   nitrofurantoin, macrocrystal-monohydrate, (Macrobid) 100 MG capsule Take 1 capsule by mouth 2 (Two) Times a Day for 7 days. 3/15/22 3/23/22  Diana Obrien MD       PHYSICAL EXAM:   /66   Wt 61.2 kg (135 lb)   LMP 01/24/2022 (Approximate)   BMI 22.47 kg/m²   General: Alert, healthy, no distress, well nourished and well developed.  Neurologic: Alert, oriented to person, place, and time.  Gait normal.  Cranial nerves II-XII grossly intact.  HEENT: Normocephalic, atraumatic.  Extraocular muscles intact,  pupils equal and reactive x2.    Teeth: Normal hygiene.  Neck: Supple, no adenopathy, thyroid normal size, non-tender, without nodularity, trachea midline.  Lungs: Normal respiratory effort.  Clear to auscultation bilaterally.  No wheezes, rhonci, or rales.  Heart: Regular rate and rhythm.  No murmer, rub or gallop.  Abdomen: Soft, non-tender, non-distended,no masses, no hepatosplenomegaly, no hernia.  Skin: No rash, no lesions.  Extremities: No cyanosis, clubbing or edema.      Prelim TVUS- single IUP with CRL 9w0d,  bpm, gestational and yolk sac seen, bilateral ovaries wnl    IMPRESSION:  Jazmine Liang is a 31 y.o.  at 9w0d for a new prenatal visit.    PLAN:  1.  IUP at 9w0d  - Prenatal labs ordered  - Genetic testing, including cystic fibrosis, patient declined previously   - Continue prenatal vitamins  - Weight gain counseling performed.   - Pregravid BMI 18.5-24.9: Recommend 25-35 lb  - Return to clinic in 4 weeks for return prenatal visit  - Reviewed COVID-19 visitation policy  - Reviewed COVID-19 precautions     Diagnosis Plan   1. 9 weeks gestation of pregnancy     2. High-risk pregnancy in first trimester     3. Rh alloimmunization, maternal, antepartum, first trimester, fetus 1  Antibody Titer- 4 weeks    Scheduled with [facility] in [place] on 2022 for fetal anatomy scan and MFM consultation    4. GBS (group b Streptococcus) UTI complicating pregnancy, first trimester  Completed Macrobid, will receive abx while on L/D   5. Nausea and vomiting during pregnancy prior to 22 weeks gestation  Encouraged small frequent protein snacking  May continue CLAU Case  3/23/2022  10:37 CDT

## 2022-04-07 ENCOUNTER — TELEPHONE (OUTPATIENT)
Dept: OBSTETRICS AND GYNECOLOGY | Facility: CLINIC | Age: 32
End: 2022-04-07

## 2022-04-07 NOTE — TELEPHONE ENCOUNTER
Patient called reporting she has had some mild spotting no cramping denies vaginal discharge and has not have recent intercourse.  I advised patient to put on a pad and to monitor it if bleeding becomes heavier or she has any additional concerns to call or come in patient verbalize understanding.

## 2022-04-08 ENCOUNTER — ROUTINE PRENATAL (OUTPATIENT)
Dept: OBSTETRICS AND GYNECOLOGY | Facility: CLINIC | Age: 32
End: 2022-04-08

## 2022-04-08 VITALS — WEIGHT: 138.4 LBS | BODY MASS INDEX: 23.03 KG/M2 | SYSTOLIC BLOOD PRESSURE: 114 MMHG | DIASTOLIC BLOOD PRESSURE: 64 MMHG

## 2022-04-08 DIAGNOSIS — O09.91 HIGH-RISK PREGNANCY IN FIRST TRIMESTER: ICD-10-CM

## 2022-04-08 DIAGNOSIS — O26.851 SPOTTING AFFECTING PREGNANCY IN FIRST TRIMESTER: Primary | ICD-10-CM

## 2022-04-08 PROCEDURE — 99213 OFFICE O/P EST LOW 20 MIN: CPT | Performed by: OBSTETRICS & GYNECOLOGY

## 2022-04-08 NOTE — PROGRESS NOTES
CC: Spotting     Jazmine Liang is a 31 y.o.  at 11w2d who presents with worsening spotting that started yesterday.  Reports that every 4-6 hours she will saturate 1/2 of a pad.  Reports some cramping on the right side.    /64   Wt 62.8 kg (138 lb 6.4 oz)   LMP 2022 (Approximate)   BMI 23.03 kg/m²   Fetal Heart Rate: 140s   BSUS: Viable SIUP, FHT 140s bpm, small ESTEFANI noted posteriorly    3/22 Problems (from 22 to present)     Problem Noted Resolved    High-risk pregnancy in first trimester 3/23/2022 by Delmi Bynum, CLAU No    Rh alloimmunization, maternal, antepartum, first trimester, fetus 1 3/23/2022 by Delmi Bynum, CLAU No    GBS (group b Streptococcus) UTI complicating pregnancy, first trimester 3/23/2022 by Delmi Bynum, CLAU No    Nausea and vomiting during pregnancy prior to 22 weeks gestation 3/23/2022 by Delmi Bynum, CLAU No        A/P: Jazmine Liang is a 31 y.o.  at 11w2d.  - RTC PRN  - Recommend pelvic rest x2 weeks  - Desires NIPT at next visit  - Reviewed COVID-19 visitation policy  - Reviewed COVID-19 precautions     Diagnosis Plan   1. Spotting affecting pregnancy in first trimester     2. High-risk pregnancy in first trimester  INVITAE RONIS     Diana Obrien MD  2022  09:14 CDT

## 2022-04-21 ENCOUNTER — LAB (OUTPATIENT)
Dept: LAB | Facility: HOSPITAL | Age: 32
End: 2022-04-21

## 2022-04-21 ENCOUNTER — ROUTINE PRENATAL (OUTPATIENT)
Dept: OBSTETRICS AND GYNECOLOGY | Facility: CLINIC | Age: 32
End: 2022-04-21

## 2022-04-21 VITALS — SYSTOLIC BLOOD PRESSURE: 112 MMHG | BODY MASS INDEX: 23.2 KG/M2 | DIASTOLIC BLOOD PRESSURE: 64 MMHG | WEIGHT: 139.4 LBS

## 2022-04-21 DIAGNOSIS — O09.91 HIGH-RISK PREGNANCY IN FIRST TRIMESTER: Primary | ICD-10-CM

## 2022-04-21 DIAGNOSIS — O21.9 NAUSEA AND VOMITING DURING PREGNANCY PRIOR TO 22 WEEKS GESTATION: ICD-10-CM

## 2022-04-21 DIAGNOSIS — B95.1 GBS (GROUP B STREPTOCOCCUS) UTI COMPLICATING PREGNANCY, FIRST TRIMESTER: ICD-10-CM

## 2022-04-21 DIAGNOSIS — O23.41 GBS (GROUP B STREPTOCOCCUS) UTI COMPLICATING PREGNANCY, FIRST TRIMESTER: ICD-10-CM

## 2022-04-21 DIAGNOSIS — O09.91 HIGH-RISK PREGNANCY IN FIRST TRIMESTER: ICD-10-CM

## 2022-04-21 DIAGNOSIS — O36.0911 RH ALLOIMMUNIZATION, MATERNAL, ANTEPARTUM, FIRST TRIMESTER, FETUS 1: ICD-10-CM

## 2022-04-21 DIAGNOSIS — Z3A.13 13 WEEKS GESTATION OF PREGNANCY: ICD-10-CM

## 2022-04-21 LAB
A AB TITR SERPL: NORMAL {TITER}
AB TITER 2: NORMAL

## 2022-04-21 PROCEDURE — 99213 OFFICE O/P EST LOW 20 MIN: CPT | Performed by: OBSTETRICS & GYNECOLOGY

## 2022-04-21 PROCEDURE — 86886 COOMBS TEST INDIRECT TITER: CPT

## 2022-04-21 PROCEDURE — 36415 COLL VENOUS BLD VENIPUNCTURE: CPT

## 2022-04-21 NOTE — PROGRESS NOTES
CC: Prenatal visit    Jazmine Liang is a 31 y.o.  at 13w1d.  Doing well.  Denies contractions, LOF, or VB.  Reports constant pelvic pressure.  She has a history of hip dysplasia and has seen Rajani in the past.     /64   Wt 63.2 kg (139 lb 6.4 oz)   LMP 2022 (Approximate)   BMI 23.20 kg/m²   Fetal Heart Rate: 140s    3/22 Problems (from 22 to present)     Problem Noted Resolved    High-risk pregnancy in first trimester 3/23/2022 by Delmi Bynum APRN No    Rh alloimmunization, maternal, antepartum, first trimester, fetus 1 3/23/2022 by Delmi Bynum APRN No    GBS (group b Streptococcus) UTI complicating pregnancy, first trimester 3/23/2022 by Delmi Bynum APRN No    Nausea and vomiting during pregnancy prior to 22 weeks gestation 3/23/2022 by Delmi Bynum APRN No        A/P: Jazmine Liang is a 31 y.o.  at 13w1d.  - RTC in 4 weeks  - NIPT today  - Offered PFPT, chiropractor.  She feels comfortable w/ exercises and declines referral at this time.  - Reviewed COVID-19 visitation policy  - Reviewed COVID-19 precautions     Diagnosis Plan   1. High-risk pregnancy in first trimester     2. Nausea and vomiting during pregnancy prior to 22 weeks gestation     3. Rh alloimmunization, maternal, antepartum, first trimester, fetus 1     4. GBS (group b Streptococcus) UTI complicating pregnancy, first trimester     5. 13 weeks gestation of pregnancy       Diana Obrien MD  2022  09:41 CDT

## 2022-05-20 ENCOUNTER — ROUTINE PRENATAL (OUTPATIENT)
Dept: OBSTETRICS AND GYNECOLOGY | Facility: CLINIC | Age: 32
End: 2022-05-20

## 2022-05-20 ENCOUNTER — LAB (OUTPATIENT)
Dept: LAB | Facility: HOSPITAL | Age: 32
End: 2022-05-20

## 2022-05-20 VITALS — SYSTOLIC BLOOD PRESSURE: 102 MMHG | WEIGHT: 140 LBS | BODY MASS INDEX: 23.3 KG/M2 | DIASTOLIC BLOOD PRESSURE: 66 MMHG

## 2022-05-20 DIAGNOSIS — Z3A.17 17 WEEKS GESTATION OF PREGNANCY: ICD-10-CM

## 2022-05-20 DIAGNOSIS — O09.92 SUPERVISION OF HIGH RISK PREGNANCY IN SECOND TRIMESTER: Primary | ICD-10-CM

## 2022-05-20 DIAGNOSIS — O36.1920 MATERNAL RED CELL ALLOIMMUNIZATION IN SECOND TRIMESTER, SINGLE OR UNSPECIFIED FETUS: ICD-10-CM

## 2022-05-20 DIAGNOSIS — O21.9 NAUSEA AND VOMITING DURING PREGNANCY PRIOR TO 22 WEEKS GESTATION: ICD-10-CM

## 2022-05-20 DIAGNOSIS — R82.71 GBS BACTERIURIA: ICD-10-CM

## 2022-05-20 LAB
A AB TITR SERPL: NORMAL {TITER}
AB TITER 2: NORMAL

## 2022-05-20 PROCEDURE — 99213 OFFICE O/P EST LOW 20 MIN: CPT | Performed by: OBSTETRICS & GYNECOLOGY

## 2022-05-20 PROCEDURE — 86886 COOMBS TEST INDIRECT TITER: CPT

## 2022-05-20 PROCEDURE — 36415 COLL VENOUS BLD VENIPUNCTURE: CPT

## 2022-05-20 NOTE — PROGRESS NOTES
CC: Prenatal visit    Jazmine Liang is a 31 y.o.  at 17w2d.  Doing well.  Denies contractions, LOF, or VB.  Has not felt FM yet.  She has bothersome varicose veins in her right leg, which she has had in all of her other pregnancies but worse this time; denies pain.  She sees TSP on the .    /66   Wt 63.5 kg (140 lb)   LMP 2022 (Approximate)   BMI 23.30 kg/m²   Fetal Heart Rate: 146    3/22 Problems (from 22 to present)     Problem Noted Resolved    Supervision of high risk pregnancy in second trimester 3/23/2022 by Delmi Bynum APRN No    Maternal red cell alloimmunization in second trimester 3/23/2022 by Delmi Bynum APRN No    GBS bacteriuria 3/23/2022 by Delmi Bynum APRN No    Nausea and vomiting during pregnancy prior to 22 weeks gestation 3/23/2022 by Delmi Bynum APRN No        A/P: Jazmine Liang is a 31 y.o.  at 17w2d.  - RTC in 4 weeks  - Titers today  - Reassurance provided regarding varicose veins  - Reviewed COVID-19 visitation policy  - Reviewed COVID-19 precautions     Diagnosis Plan   1. Supervision of high risk pregnancy in second trimester     2. Nausea and vomiting during pregnancy prior to 22 weeks gestation     3. Maternal red cell alloimmunization in second trimester, single or unspecified fetus  Antibody Titer   4. GBS bacteriuria     5. 17 weeks gestation of pregnancy       Diana Obrien MD  2022  08:59 CDT

## 2022-05-26 ENCOUNTER — TELEPHONE (OUTPATIENT)
Dept: OBSTETRICS AND GYNECOLOGY | Facility: CLINIC | Age: 32
End: 2022-05-26

## 2022-05-26 NOTE — TELEPHONE ENCOUNTER
----- Message from Diana Obrien MD sent at 5/25/2022  7:18 PM CDT -----  Please let her know that her titers are a little higher than they were last month; will have M review when they see her on the 31st.

## 2022-06-17 ENCOUNTER — ROUTINE PRENATAL (OUTPATIENT)
Dept: OBSTETRICS AND GYNECOLOGY | Facility: CLINIC | Age: 32
End: 2022-06-17

## 2022-06-17 VITALS — SYSTOLIC BLOOD PRESSURE: 100 MMHG | WEIGHT: 145.4 LBS | DIASTOLIC BLOOD PRESSURE: 60 MMHG | BODY MASS INDEX: 24.2 KG/M2

## 2022-06-17 DIAGNOSIS — O36.1920 MATERNAL RED CELL ALLOIMMUNIZATION IN SECOND TRIMESTER, SINGLE OR UNSPECIFIED FETUS: ICD-10-CM

## 2022-06-17 DIAGNOSIS — O09.92 SUPERVISION OF HIGH RISK PREGNANCY IN SECOND TRIMESTER: Primary | ICD-10-CM

## 2022-06-17 DIAGNOSIS — O21.9 NAUSEA AND VOMITING DURING PREGNANCY PRIOR TO 22 WEEKS GESTATION: ICD-10-CM

## 2022-06-17 DIAGNOSIS — R82.71 GBS BACTERIURIA: ICD-10-CM

## 2022-06-17 DIAGNOSIS — Z3A.21 21 WEEKS GESTATION OF PREGNANCY: ICD-10-CM

## 2022-06-17 PROCEDURE — 99213 OFFICE O/P EST LOW 20 MIN: CPT | Performed by: OBSTETRICS & GYNECOLOGY

## 2022-06-17 NOTE — PROGRESS NOTES
CC: Prenatal visit    Jazmine Liang is a 31 y.o.  at 21w2d.  Doing well.  Denies contractions, LOF, or VB.  Reports +FM.  She is following w/ M for MCA dopplers.    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  at 21w2d.  - RTC in 4 weeks   - No further titers indicated due to prior levels (per MFM)  - Reviewed COVID-19 visitation policy  - Reviewed COVID-19 precautions     Diagnosis Plan   1. Supervision of high risk pregnancy in second trimester     2. Nausea and vomiting during pregnancy prior to 22 weeks gestation     3. 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 GBS bacteriuria     5. 21 weeks gestation of pregnancy       Diana Obrien MD  2022  11:03 CDT

## 2022-07-14 ENCOUNTER — ROUTINE PRENATAL (OUTPATIENT)
Dept: OBSTETRICS AND GYNECOLOGY | Facility: CLINIC | Age: 32
End: 2022-07-14

## 2022-07-14 VITALS — WEIGHT: 152 LBS | BODY MASS INDEX: 25.29 KG/M2 | DIASTOLIC BLOOD PRESSURE: 60 MMHG | SYSTOLIC BLOOD PRESSURE: 98 MMHG

## 2022-07-14 DIAGNOSIS — R82.71 GBS BACTERIURIA: ICD-10-CM

## 2022-07-14 DIAGNOSIS — O36.1920 MATERNAL RED CELL ALLOIMMUNIZATION IN SECOND TRIMESTER, SINGLE OR UNSPECIFIED FETUS: ICD-10-CM

## 2022-07-14 DIAGNOSIS — Z3A.25 25 WEEKS GESTATION OF PREGNANCY: ICD-10-CM

## 2022-07-14 DIAGNOSIS — O09.92 SUPERVISION OF HIGH RISK PREGNANCY IN SECOND TRIMESTER: Primary | ICD-10-CM

## 2022-07-14 DIAGNOSIS — O21.9 NAUSEA AND VOMITING DURING PREGNANCY PRIOR TO 22 WEEKS GESTATION: ICD-10-CM

## 2022-07-14 PROCEDURE — 99213 OFFICE O/P EST LOW 20 MIN: CPT | Performed by: OBSTETRICS & GYNECOLOGY

## 2022-08-03 DIAGNOSIS — M25.559 HIP PAIN: Primary | ICD-10-CM

## 2022-08-08 ENCOUNTER — LAB (OUTPATIENT)
Dept: LAB | Facility: HOSPITAL | Age: 32
End: 2022-08-08

## 2022-08-08 ENCOUNTER — ROUTINE PRENATAL (OUTPATIENT)
Dept: OBSTETRICS AND GYNECOLOGY | Facility: CLINIC | Age: 32
End: 2022-08-08

## 2022-08-08 VITALS — SYSTOLIC BLOOD PRESSURE: 110 MMHG | DIASTOLIC BLOOD PRESSURE: 66 MMHG | WEIGHT: 158 LBS | BODY MASS INDEX: 26.29 KG/M2

## 2022-08-08 DIAGNOSIS — Z23 NEED FOR TDAP VACCINATION: ICD-10-CM

## 2022-08-08 DIAGNOSIS — O09.93 SUPERVISION OF HIGH RISK PREGNANCY IN THIRD TRIMESTER: ICD-10-CM

## 2022-08-08 DIAGNOSIS — O36.1920 MATERNAL RED CELL ALLOIMMUNIZATION IN SECOND TRIMESTER, SINGLE OR UNSPECIFIED FETUS: ICD-10-CM

## 2022-08-08 DIAGNOSIS — R82.71 GBS BACTERIURIA: ICD-10-CM

## 2022-08-08 DIAGNOSIS — Z3A.28 28 WEEKS GESTATION OF PREGNANCY: Primary | ICD-10-CM

## 2022-08-08 DIAGNOSIS — O09.92 SUPERVISION OF HIGH RISK PREGNANCY IN SECOND TRIMESTER: ICD-10-CM

## 2022-08-08 PROBLEM — O21.9 NAUSEA AND VOMITING DURING PREGNANCY PRIOR TO 22 WEEKS GESTATION: Status: RESOLVED | Noted: 2022-03-23 | Resolved: 2022-08-08

## 2022-08-08 LAB
DEPRECATED RDW RBC AUTO: 42.3 FL (ref 37–54)
ERYTHROCYTE [DISTWIDTH] IN BLOOD BY AUTOMATED COUNT: 12.6 % (ref 12.3–15.4)
GLUCOSE 1H P 100 G GLC PO SERPL-MCNC: 127 MG/DL (ref 65–139)
HCT VFR BLD AUTO: 36.1 % (ref 34–46.6)
HGB BLD-MCNC: 12.3 G/DL (ref 12–15.9)
MCH RBC QN AUTO: 31.5 PG (ref 26.6–33)
MCHC RBC AUTO-ENTMCNC: 34.1 G/DL (ref 31.5–35.7)
MCV RBC AUTO: 92.6 FL (ref 79–97)
PLATELET # BLD AUTO: 213 10*3/MM3 (ref 140–450)
PMV BLD AUTO: 9.6 FL (ref 6–12)
RBC # BLD AUTO: 3.9 10*6/MM3 (ref 3.77–5.28)
WBC NRBC COR # BLD: 9.18 10*3/MM3 (ref 3.4–10.8)

## 2022-08-08 PROCEDURE — 90471 IMMUNIZATION ADMIN: CPT | Performed by: NURSE PRACTITIONER

## 2022-08-08 PROCEDURE — 36415 COLL VENOUS BLD VENIPUNCTURE: CPT

## 2022-08-08 PROCEDURE — 99213 OFFICE O/P EST LOW 20 MIN: CPT | Performed by: NURSE PRACTITIONER

## 2022-08-08 PROCEDURE — 82950 GLUCOSE TEST: CPT

## 2022-08-08 PROCEDURE — 85027 COMPLETE CBC AUTOMATED: CPT

## 2022-08-08 PROCEDURE — 90715 TDAP VACCINE 7 YRS/> IM: CPT | Performed by: NURSE PRACTITIONER

## 2022-08-08 NOTE — PROGRESS NOTES
CC: Prenatal visit    Jazmine Liang is a 31 y.o.  at 28w5d.  Patient reports episodes 4-5x/week of feeling like she is going to pass out.  When she has these episodes water and laying down help.   Also, she notes vulva swelling.  [facility] came back negative.  Denies contractions, LOF, or VB.  Reports good FM.    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  at 28w5d.  3T labs today  Recommended eating every couple hours, increase fluid intake.  Change positions slowly to avoid orthostatic hypotension  Vulva swelling could be due to varicosities, she has varicosities in BLE  Tdap give today  - RTC in 2 weeks     Diagnosis Plan   1. 28 weeks gestation of pregnancy     2. Supervision of high risk pregnancy in third trimester     3. 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 GBS bacteriuria         CLAU Farris  2022  09:17 CDT

## 2022-08-09 ENCOUNTER — HOSPITAL ENCOUNTER (OUTPATIENT)
Dept: PHYSICAL THERAPY | Facility: HOSPITAL | Age: 32
Setting detail: THERAPIES SERIES
Discharge: HOME | End: 2022-08-09

## 2022-08-09 DIAGNOSIS — O26.893 PREGNANCY RELATED HIP PAIN IN THIRD TRIMESTER, ANTEPARTUM: Primary | ICD-10-CM

## 2022-08-09 DIAGNOSIS — M25.559 PREGNANCY RELATED HIP PAIN IN THIRD TRIMESTER, ANTEPARTUM: Primary | ICD-10-CM

## 2022-08-09 PROCEDURE — 97162 PT EVAL MOD COMPLEX 30 MIN: CPT | Performed by: PHYSICAL THERAPIST

## 2022-08-09 PROCEDURE — 97140 MANUAL THERAPY 1/> REGIONS: CPT | Performed by: PHYSICAL THERAPIST

## 2022-08-10 NOTE — THERAPY EVALUATION
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   • GBS bacteriuria        Past Medical History:   Diagnosis Date   • Hip dysplasia, congenital    • History of postpartum depression    • History of transfusion    • Maternal care for rhesus isoimmunization, third trimester, delivered     maternal care for other isoimmunization, third triemster, not applicable or unspecified   • Varicella         Past Surgical History:   Procedure Laterality Date   • TONSILLECTOMY     • WISDOM TOOTH EXTRACTION       Allergies   Allergen Reactions   • Bactrim [Sulfamethoxazole-Trimethoprim] Rash   • Ceclor [Cefaclor] Rash         Visit Dx:    ICD-10-CM ICD-9-CM   1. Pregnancy related hip pain in third trimester, antepartum  O26.893 646.83    M25.559 719.45            Pelvic Health     Row Name 22 1500             Pregnancy Questions    Number of Pregnancies 3  -SW      Number of Miscarriages 0  -SW      Number of Children 2  Luke and Peng  -SW      How many weeks pregnant are you? 29 weeks  [place]  -SW      Type of Previous Deliveries Vaginal  -SW      Due Date 10/26/22  -SW              Pain Assessment    Pain Assessment 0-10  -SW      Pain Score 2  -SW      Post Pain Score 0  -SW            User Key  (r) = Recorded By, (t) = Taken By, (c) = Cosigned By    Initials Name Provider Type    Rajani Pozo, PT DPT Physical Therapist               PT Ortho     Row Name 22 1500       Subjective Comments    Subjective Comments Has had hip problems for some time.  Notes that problems have been present since age 12.  Worse with  adulthood and especially in pregnancy. Hip comes out of place and sometimes stays out for time.  Last night out of place and stayed out for til earlier this morning.  No evidence of hypermobility in other joints. Pain in hip is mainly when it is out.  The surrounding muscles are what really hurts. No pain really with rest.  Weight bearing or laying down position allows pain to reduce.  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  -       Subjective Pain    Able to rate subjective pain? yes  -    Pre-Treatment Pain Level 2  -    Post-Treatment Pain Level 0  -SW       Posture/Observations    Posture- WNL Posture is WNL  -SW    Posture/Observations Comments aligned in standing position. Gait is with symmetry.  No antalgia noted.  Independent with transfers.  -SW       Quarter Clearing    Quarter Clearing Lower Quarter Clearing  -SW       DTR- Lower Quarter Clearing    Patellar tendon (L2-4) 2- Normal response  -SW    Achilles tendon (S1-2) 2- Normal response  -SW       Neural Tension Signs- Lower Quarter Clearing    Slump Negative  -SW    SLR Negative  -SW       Sensory Screen for Light Touch- Lower Quarter Clearing    L1 (inguinal area) Intact  -SW    L2 (anterior mid thigh) Intact  -SW    L3 (distal anterior thigh) Intact  -SW    L4 (medial lower leg/foot) Intact  -SW    L5 (lateral lower leg/great toe) Intact  -SW    S1 (bottom of foot) Intact  -SW       Myotomal Screen- Lower Quarter Clearing    Hip flexion (L2) 4 (Good)  -SW    Knee extension (L3) 5 (Normal)  -SW    Knee flexion (S2) 5 (Normal)  -SW       Lumbar ROM Screen- Lower Quarter Clearing    Lumbar Flexion Normal  -SW    Lumbar Extension Normal  -SW    Lumbar Lateral Flexion Normal  -SW    Lumbar Rotation Normal  -SW       SI/Hip Screen- Lower Quarter Clearing    ASIS compression Positive  -SW    ASIS distraction Negative  -SW    Anastasiia's/Dylan's test Positive  -SW    Pain in Deepak's area Positive  -SW       Special  Tests/Palpation    Special Tests/Palpation Lumbar/SI  -SW       Lumbosacral Accessory Motions    Lumbosacral Accessory Motions Tested? Yes  -    PA glide- Sacral base WNL  symmetrical  -    Innominate rotation --  ant rotation of R innom  -SW       Lumbosacral Palpation    Lumbosacral Palpation? Yes  -    Piriformis Left:;Guarded/taut;Tender;Elicits spasm;Trigger point  -    Gluteus Milan Left:;Guarded/taut;Tender;Trigger point;Elicits spasm  -    Quadratus Lumborum --  nontender  -    Lumbosacral Palpation Comments Patient presented with R ant innom in supine.  + taut bands and trigger points noted in piriformis and glut L side.  -       General ROM    GENERAL ROM COMMENTS functional throughout LE  -SW       Balance Skills Training    Balance Comments normal dynamic and static balance activities.  -       Transfers    Comment, (Transfers) independent with all transfers.  -SW       Gait/Stairs (Locomotion)    Comment, (Gait/Stairs) equal and symmetrical step length noted.  -          User Key  (r) = Recorded By, (t) = Taken By, (c) = Cosigned By    Initials Name Provider Type    Rajani Pozo, PT DPT Physical Therapist                            PT Assessment/Plan     Row Name 08/09/22 1700          PT Assessment    Functional Limitations Performance in leisure activities;Performance in self-care ADL;Performance in work activities;Limitation in home management;Limitations in community activities  -     Impairments Pain;Poor body mechanics;Posture;Impaired postural alignment;Impaired muscle endurance  -     Assessment Comments Patient is a 32 yo female presenting with complaints of LBP.  She presents with ant innom on R in supine with myofascial pain to glut and piriformis on the L  Pt would benefit from continued skilled therapy to improve muscle function, resolve triggers and improve stability with less pain.  -     Rehab Potential Good  -     Patient/caregiver participated in  establishment of treatment plan and goals Yes  -SW     Patient would benefit from skilled therapy intervention Yes  -SW            PT Plan    PT Frequency 1x/week  -SW     Planned CPT's? PT EVAL MOD COMPLELITY: 67292;PT RE-EVAL: 71743;PT THER PROC EA 15 MIN: 63300;PT THER ACT EA 15 MIN: 29800;PT MANUAL THERAPY EA 15 MIN: 11420;PT NEUROMUSC RE-EDUCATION EA 15 MIN: 80259;PT SELF CARE/HOME MGMT/TRAIN EA 15: 44875  -     PT Plan Comments manual therapy release glut and piriformis L.  MET with ant rotated innom.  Strength, stabilization and post education as needed.  -SW           User Key  (r) = Recorded By, (t) = Taken By, (c) = Cosigned By    Initials Name Provider Type    Rajani Pozo, PT DPT Physical Therapist                   OP Exercises     Row Name 08/09/22 1500             Subjective Comments    Subjective Comments Has had hip problems for some time.  Notes that problems have been present since age 12.  Worse with adulthood and especially in pregnancy. Hip comes out of place and sometimes stays out for time.  Last night out of place and stayed out for til earlier this morning.  No evidence of hypermobility in other joints. Pain in hip is mainly when it is out.  The surrounding muscles are what really hurts. No pain really with rest.  Weight bearing or laying down position allows pain to reduce.  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  -SW              Subjective Pain    Able to rate subjective pain? yes  -SW      Pre-Treatment Pain Level 2  -SW      Post-Treatment Pain Level 0  -SW              Exercise 1    Exercise Name 1 angry cat stretch  -SW      Reps 1 10  -SW      Time 1 5 sec  -SW              Exercise 2    Exercise Name 2 piriformis stretch  -SW      Reps 2 3  -SW      Time 2 30 sec  -SW              Exercise 3    Exercise Name 3 MET for home use  -SW      Additional Comments instructed patient on MET to utilize with home to assist in pain management and reset to  pelvic girdle.  -SW              Exercise 4    Exercise Name 4 MHP  -      Additional Comments encouraged and instructed on use of MHP to assist with palliative care.  -            User Key  (r) = Recorded By, (t) = Taken By, (c) = Cosigned By    Initials Name Provider Type    Rajani Pozo, PT DPT Physical Therapist                             PT OP Goals     Row Name 08/09/22 1700          PT Short Term Goals    STG Date to Achieve 09/08/22  -     STG 1 patient to be independent and compliant with HEP such that she performs at least once daily.  -SW     STG 1 Progress New  -     STG 2 patient to present with alignment in supine position such that no MEt is required  -SW     STG 2 Progress New  -     STG 3 patient to report pain as mild 3/10 or less with use of positioning and HEP  -     STG 3 Progress New  -     STG 4 patient to engage TA correctly and without cues for performance of t/f to provide stability for pelvic girdle.  -     STG 4 Progress New  -     STG 5 patient to have resolved trigger points to piriformis and glut on L such that no radiation of pain is noted  -     STG 5 Progress New  [facility]            Long Term Goals    LTG Date to Achieve 10/06/22  -     LTG 1 patient to report improvement o 70% better overall since induction of PT  -     LTG 1 Progress New  -     LTG 2 patient to maintain normal work and caregiving activities without restrictions for full term of pregnancy  -     LTG 2 Progress New  -     LTG 3 patient to show improved stabilization such that ASLR is completed with good stabilization and no trunk roll.  -     LTG 3 Progress New  [facility]            Time Calculation    PT Goal Re-Cert Due Date 09/08/22  -           User Key  (r) = Recorded By, (t) = Taken By, (c) = Cosigned By    Initials Name Provider Type    Rajani Pozo, PT DPT Physical Therapist                Therapy Education  Given: HEP, Posture/body mechanics  Program: New  How  Provided: Verbal, Demonstration, Written  Provided to: Patient  Level of Understanding: Teach back education performed, Verbalized, Demonstrated               Time Calculation:   Start Time: 1503  Stop Time: 1610  Time Calculation (min): 67 min  Therapy Charges for Today     Code Description Service Date Service Provider Modifiers Qty    90628997697 HC PT EVAL MOD COMPLEXITY 3 8/9/2022 Rajani Wells, PT DPT GP 1    95056303016 HC PT MANUAL THERAPY EA 15 MIN 8/9/2022 Rajani Wells, PT DPT GP 2                  Rajani Wells, PT DPT  8/10/2022

## 2022-08-18 ENCOUNTER — ROUTINE PRENATAL (OUTPATIENT)
Dept: OBSTETRICS AND GYNECOLOGY | Facility: CLINIC | Age: 32
End: 2022-08-18

## 2022-08-18 ENCOUNTER — HOSPITAL ENCOUNTER (OUTPATIENT)
Dept: PHYSICAL THERAPY | Facility: HOSPITAL | Age: 32
Setting detail: THERAPIES SERIES
Discharge: HOME | End: 2022-08-18

## 2022-08-18 VITALS — DIASTOLIC BLOOD PRESSURE: 62 MMHG | WEIGHT: 157.6 LBS | BODY MASS INDEX: 26.23 KG/M2 | SYSTOLIC BLOOD PRESSURE: 110 MMHG

## 2022-08-18 DIAGNOSIS — O09.93 SUPERVISION OF HIGH RISK PREGNANCY IN THIRD TRIMESTER: Primary | ICD-10-CM

## 2022-08-18 DIAGNOSIS — R82.71 GBS BACTERIURIA: ICD-10-CM

## 2022-08-18 DIAGNOSIS — Z3A.30 30 WEEKS GESTATION OF PREGNANCY: ICD-10-CM

## 2022-08-18 DIAGNOSIS — M25.559 PREGNANCY RELATED HIP PAIN IN THIRD TRIMESTER, ANTEPARTUM: Primary | ICD-10-CM

## 2022-08-18 DIAGNOSIS — O36.1930 MATERNAL RED CELL ALLOIMMUNIZATION IN THIRD TRIMESTER, ANTEPARTUM, NOT APPLICABLE OR UNSPECIFIED FETUS: ICD-10-CM

## 2022-08-18 DIAGNOSIS — O26.893 PREGNANCY RELATED HIP PAIN IN THIRD TRIMESTER, ANTEPARTUM: Primary | ICD-10-CM

## 2022-08-18 PROCEDURE — 97140 MANUAL THERAPY 1/> REGIONS: CPT | Performed by: PHYSICAL THERAPIST

## 2022-08-18 PROCEDURE — 99213 OFFICE O/P EST LOW 20 MIN: CPT | Performed by: OBSTETRICS & GYNECOLOGY

## 2022-08-18 PROCEDURE — 97535 SELF CARE MNGMENT TRAINING: CPT | Performed by: PHYSICAL THERAPIST

## 2022-08-18 PROCEDURE — 97110 THERAPEUTIC EXERCISES: CPT | Performed by: PHYSICAL THERAPIST

## 2022-08-18 NOTE — PROGRESS NOTES
CC: Prenatal visit    Jazmine Liang is a 31 y.o.  at 30w1d.  Doing well.  Denies contractions, LOF, or VB.  Reports good FM.  She just did PFPT and Rajani recommended that during delivery, her L leg stays more straight during delivery.    /62   Wt 71.5 kg (157 lb 9.6 oz)   LMP 2022 (Approximate)   BMI 26.23 kg/m²   Fundal Height (cm): 29 cm  Fetal Heart Rate: 134    3/22 Problems (from 22 to present)     Problem Noted Resolved    Supervision of high risk pregnancy in third trimester 3/23/2022 by Delmi Bynum APRN No    Maternal red cell alloimmunization in third trimester, antepartum, not applicable or unspecified fetus 3/23/2022 by Delmi Bynum APRN No    Overview Signed 2022  1:39 PM by Diana Obrien MD     S/p TSP MFM consult; declined amniocentesis  Weekly MCA dopplers  Weekly BPP/DORI starting at 32 weeks  Delivery at 39 wks         GBS bacteriuria 3/23/2022 by Delmi Bynum APRN No    Nausea and vomiting during pregnancy prior to 22 weeks gestation 3/23/2022 by Delmi Bynum APRN 2022 by Delmi Bynum APRN        A/P: Jazmine Liang is a 31 y.o.  at 30w1d.  - RTC in 2 weeks  - Reviewed COVID-19 visitation policy  - Reviewed COVID-19 precautions     Diagnosis Plan   1. Supervision of high risk pregnancy in third trimester     2. 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 GBS bacteriuria     4. 30 weeks gestation of pregnancy       Diana Obrien MD  2022  11:30 CDT

## 2022-08-18 NOTE — THERAPY TREATMENT NOTE
Outpatient Physical Therapy Pelvic Health Treatment Note  [facility]     Patient Name: Jazmine Liang  : 1990  MRN: [id number]  Today's Date: 2022        Visit Date: 2022   Visit number:   Recheck: 22  Insurance: Medicaid      Visit Dx:    ICD-10-CM ICD-9-CM   1. 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 30 weeks  -SW      Type of Previous Deliveries Vaginal  -SW      Due Date 10/26/22  -SW              Pain Assessment    Pain Score 2  -SW      Post Pain Score 0  -SW              Lumbosacral Palpation    Lumbosacral Palpation Comments Less amounts of triggering noted through glut muscle.  Mild along piriformis muscle fibers.  -SW            User Key  (r) = Recorded By, (t) = Taken By, (c) = Cosigned By    Initials Name Provider Type    Rajani Pozo, PT DPT Physical Therapist               PT Ortho     Row Name 22 0900       Subjective Comments    Subjective Comments The stretch has been a game changer for me daily.  Not bad this morning.  Believed hip came out of place earlier this week.  Pain noted in groin region bilaterally. Has vulvar varicosities which makes things worse.  Has ordered compression garments to use for that.  -SW       Subjective Pain    Able to rate subjective pain? yes  -SW    Pre-Treatment Pain Level 2  -SW    Post-Treatment Pain Level 0  -SW       Posture/Observations    Posture/Observations Comments good mood and judgement.  nonantalgia  gait.  -       SI/Hip Screen- Lower Quarter Clearing    ASIS compression Positive  -       Lumbosacral Accessory Motions    PA glide- Sacral base WNL  -    Innominate rotation --  R ant rotation  -SW       Lumbosacral Palpation    Lumbosacral Palpation? Yes  -    Piriformis Left:;Tender;Guarded/taut  mild tautness to midline muscle but without referral of pain  -          User Key  (r) = Recorded By, (t) = Taken By, (c) = Cosigned By    Initials Name Provider Type    Rajani Pozo, PT DPT Physical Therapist                            PT Assessment/Plan     Row Name 08/18/22 0900          PT Assessment    Functional Limitations Performance in leisure activities;Performance in self-care ADL;Performance in work activities;Limitation in home management;Limitations in community activities  -     Impairments Pain;Poor body mechanics;Posture;Impaired postural alignment;Impaired muscle endurance  -     Assessment Comments Patient with less triggering noted to QL and glut muscle this date. Remains with ant innom on R but easily corrected with MET. Piriformis continue with mild irritation but improving fascial glide.  Pt concerned with varicosities in labia region.  She has purchased compression garments to use but has yet to receive.  -     Rehab Potential Good  -     Patient/caregiver participated in establishment of treatment plan and goals Yes  -     Patient would benefit from skilled therapy intervention Yes  -            PT Plan    PT Frequency 1x/week  -     PT Plan Comments Cont manual work.  begin clamshells and hip abd next visit.  -           User Key  (r) = Recorded By, (t) = Taken By, (c) = Cosigned By    Initials Name Provider Type    Rajani Pozo, PT DPT Physical Therapist                   OP Exercises     Row Name 08/18/22 0900             Subjective Comments    Subjective Comments The stretch has been a game changer for me daily.  Not bad this morning.  Believed  hip came out of place earlier this week.  Pain noted in groin region bilaterally. Has vulvar varicosities which makes things worse.  Has ordered compression garments to use for that.  -SW              Subjective Pain    Able to rate subjective pain? yes  -SW      Pre-Treatment Pain Level 2  -SW      Post-Treatment Pain Level 0  -SW              Exercise 1    Exercise Name 1 hamstring stretch  -SW      Reps 1 2  -SW      Time 1 30 sec  -SW      Additional Comments bilateral  -SW              Exercise 2    Exercise Name 2 adductor stretch  -SW      Reps 2 2  -SW      Time 2 30 sec  -SW      Additional Comments bilateral  -SW              Exercise 3    Exercise Name 3 piriformis stretch straight and SB  -SW      Reps 3 3  -SW      Time 3 30 sec  -SW              Exercise 4    Exercise Name 4 modified piriformmis stretch standing with rotation  -SW      Reps 4 3  -SW      Time 4 30 sec  -SW              Exercise 5    Exercise Name 5 prone TKE  -SW      Reps 5 20  -SW              Exercise 6    Exercise Name 6 angry cat stretch  -SW      Reps 6 10  -SW              Exercise 7    Exercise Name 7 discussed compression garments/support bands  -SW      Additional Comments pt has already purchased support garments.  Not yet received.  -SW              Exercise 8    Exercise Name 8 MD follow up  -SW      Additional Comments discussed with patient the need for further investigation of hip upon delivery to RO complications of labral insufficiency/tear etc.  Pt does plan to discuss with MD post delivery.  -SW              Exercise 9    Exercise Name 9 birthing position  -SW      Additional Comments discussed with patient the possibility of considerations with birthing positions due to hip issues.  May consider sidelying birthing or supported L LE by spouse to avoid extreme ER.  -SW            User Key  (r) = Recorded By, (t) = Taken By, (c) = Cosigned By    Initials Name Provider Type    Rajani Pozo, PT DPT Physical  Therapist              Manual Rx (last 36 hours)     Manual Treatments     Row Name 08/18/22 1700             Manual Rx 1    Manual Rx 1 Location Alignment check with MET R ant rotation  -SW              Manual Rx 2    Manual Rx 2 Location QL deactivation and reinforcement  -SW              Manual Rx 3    Manual Rx 3 Location piriformis deactivation and reinforcment  -SW            User Key  (r) = Recorded By, (t) = Taken By, (c) = Cosigned By    Initials Name Provider Type    Rajani Pozo, PT DPT Physical Therapist                           PT OP Goals     Row Name 08/18/22 1700          PT Short Term Goals    STG Date to Achieve 09/08/22  -SW     STG 1 patient to be independent and compliant with HEP such that she performs at least once daily.  -SW     STG 1 Progress Progressing  -     STG 2 patient to present with alignment in supine position such that no MEt is required  -SW     STG 2 Progress Progressing  -     STG 3 patient to report pain as mild 3/10 or less with use of positioning and HEP  -SW     STG 3 Progress New  -     STG 4 patient to engage TA correctly and without cues for performance of t/f to provide stability for pelvic girdle.  -SW     STG 4 Progress New  -     STG 5 patient to have resolved trigger points to piriformis and glut on L such that no radiation of pain is noted  -SW     STG 5 Progress Progressing  -     STG 5 Progress Comments glut much improved and fascial mobility increasing to piriformis  -            Long Term Goals    LTG Date to Achieve 10/06/22  -     LTG 1 patient to report improvement o 70% better overall since induction of PT  -SW     LTG 1 Progress New  -     LTG 2 patient to maintain normal work and caregiving activities without restrictions for full term of pregnancy  -     LTG 2 Progress New  -     LTG 3 patient to show improved stabilization such that ASLR is completed with good stabilization and no trunk roll.  -     LTG 3 Progress New   -            Time Calculation    PT Goal Re-Cert Due Date 09/08/22  -           User Key  (r) = Recorded By, (t) = Taken By, (c) = Cosigned By    Initials Name Provider Type    Rajani Pozo PT DPT Physical Therapist                 Therapy Education  Given: HEP, Symptoms/condition management, Posture/body mechanics  Program: Reinforced, Progressed  How Provided: Verbal, Demonstration  Provided to: Patient  Level of Understanding: Teach back education performed, Verbalized, Demonstrated              Time Calculation:   Start Time: 0918  Stop Time: 1028  Time Calculation (min): 70 min  Therapy Charges for Today     Code Description Service Date Service Provider Modifiers Qty    18167355775  PT THER PROC EA 15 MIN 8/18/2022 Rajani Wells, PT DPT GP 2    [id number]  PT MANUAL THERAPY EA 15 MIN 8/18/2022 Rajani Wells PT DPT GP 2    [id number]  PT SELF CARE/MGMT/TRAIN EA 15 MIN 8/18/2022 Rajani Wells PT DPT GP 1                    Rajani Wells PT DPT  8/18/2022

## 2022-08-20 PROBLEM — M25.252 LAXITY OF LEFT HIP: Status: ACTIVE | Noted: 2022-08-20

## 2022-08-25 ENCOUNTER — HOSPITAL ENCOUNTER (OUTPATIENT)
Dept: PHYSICAL THERAPY | Facility: HOSPITAL | Age: 32
Setting detail: THERAPIES SERIES
Discharge: HOME | End: 2022-08-25

## 2022-08-25 DIAGNOSIS — O26.893 PREGNANCY RELATED HIP PAIN IN THIRD TRIMESTER, ANTEPARTUM: Primary | ICD-10-CM

## 2022-08-25 DIAGNOSIS — M25.559 PREGNANCY RELATED HIP PAIN IN THIRD TRIMESTER, ANTEPARTUM: Primary | ICD-10-CM

## 2022-08-25 PROCEDURE — 97140 MANUAL THERAPY 1/> REGIONS: CPT | Performed by: PHYSICAL THERAPIST

## 2022-08-25 PROCEDURE — 97110 THERAPEUTIC EXERCISES: CPT | Performed by: PHYSICAL THERAPIST

## 2022-08-25 NOTE — THERAPY TREATMENT NOTE
Outpatient Physical Therapy Pelvic Health Treatment Note  [facility]     Patient Name: Jazmine Liang  : 1990  MRN: [id number]  Today's Date: 2022        Visit Date: 2022  Visit Number: 3/3  Recheck: 22  Insurance: Medicaid    Visit Dx:    ICD-10-CM ICD-9-CM   1. 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 31 weeks  -SW      Type of Previous Deliveries Vaginal  -SW      Due Date 10/26/22  -SW              Pain Assessment    Pain Score 0  -SW      Post Pain Score 0  -SW              Education Provided On:    HEP Comments HIp Abd L in SL  and ER on L 2x10 ea added to HEP  -SW            User Key  (r) = Recorded By, (t) = Taken By, (c) = Cosigned By    Initials Name Provider Type    Rajani Pozo, PT DPT Physical Therapist               PT Ortho     Row Name 22 0900       Subjective Comments    Subjective Comments I woke up this morning and was completely painfree.  I had one episode on Friday of hip coming out and took all day to get back in. But feeling better now and has been good since Friday.  Has even put together a bed and did well with transitions and movement.  Still a little tender into buttock but so much better than before.  -SW       Subjective Pain    Able to rate subjective pain? yes  -SW    Pre-Treatment Pain Level 0  -SW    Post-Treatment Pain Level 0  -SW       Posture/Observations    Posture/Observations Comments sit to stand  on first attempt.  Gait non-antalgic.  Equal step and stride noted bilaterally. Supine alignment intact.  -SW       Neural Tension Signs- Lower Quarter Clearing    Slump Negative  -SW    SLR Negative  -SW       SI/Hip Screen- Lower Quarter Clearing    Anastasiia's/Dylan's test Negative  -SW       Lumbosacral Accessory Motions    PA glide- Sacral base WNL  -SW    Innominate rotation --  alignment symmetrical  -SW       Lumbosacral Palpation    Piriformis Bilateral:;Tender;Guarded/taut  no trigger points  -SW    Lumbosacral Palpation Comments No trigger points identified into Glut and piriformis this morning.  Piriformis with mild tenderness but non-radicular pain in nature.  -SW       General ROM    GENERAL ROM COMMENTS functional throughout  -SW       MMT (Manual Muscle Testing)    General MMT Comments improving functional control with movements without pain on L.  Weak hip abd 4/5 and glut med 4/5 on LLE  -SW          User Key  (r) = Recorded By, (t) = Taken By, (c) = Cosigned By    Initials Name Provider Type    Rajani Pozo, PT DPT Physical Therapist                            PT Assessment/Plan     Row Name 08/25/22 0900          PT Assessment    Functional Limitations Performance in leisure activities;Performance in self-care ADL;Performance in work activities;Limitation in home management;Limitations in community activities  -     Impairments Pain;Poor body mechanics;Posture;Impaired postural alignment;Impaired muscle endurance  -     Assessment Comments Patient is feeling so much better.  Her alignment remains good this date without MET required.  Trigger points are resolving.  Though we continue with tenderness, it is not radiating and without significant taut band detected.  pt able to tolerate some strengthening additions to her program with good tolerance.  Progressing well with goals.  Per US, considering pregnancy may be further along than expected by 1-2 weeks. STG 1 met and progressing  toward consistent achievement of others.  -SW     Rehab Potential Good  -SW     Patient/caregiver participated in establishment of treatment plan and goals Yes  -SW     Patient would benefit from skilled therapy intervention Yes  -SW            PT Plan    PT Frequency 1x/week  -SW     PT Plan Comments Cont with hip abd and glut med strengthening.  Manual as indicated.  -SW           User Key  (r) = Recorded By, (t) = Taken By, (c) = Cosigned By    Initials Name Provider Type    SW Rajani Wells, PT DPT Physical Therapist                   OP Exercises     Row Name 08/25/22 0900             Subjective Comments    Subjective Comments I woke up this morning and was completely painfree.  I had one episode on Friday of hip coming out and took all day to get back in. But feeling better now and has been good since Friday.  Has even put together a bed and did well with transitions and movement.  Still a little tender into buttock but so much better than before.  -SW              Subjective Pain    Able to rate subjective pain? yes  -SW      Pre-Treatment Pain Level 0  -SW      Post-Treatment Pain Level 0  -SW              Exercise 1    Exercise Name 1 hamstring stretch  -SW      Reps 1 2  -SW      Time 1 30 sec  -SW      Additional Comments bilateral  -SW              Exercise 2    Exercise Name 2 adductor stretch  -SW      Reps 2 2  -SW      Time 2 30 sec  -SW      Additional Comments bilateral  -SW              Exercise 3    Exercise Name 3 piriformis stretch  -SW      Reps 3 3  -SW      Time 3 30 sec  -SW              Exercise 4    Exercise Name 4 Standing hip flexion on R, WB L  -SW      Reps 4 15  -SW      Additional Comments WB activities for L to offer more stability  -SW              Exercise 5    Exercise Name 5 standing SLR on R with WB on L  -SW      Reps 5 15x each  -SW      Additional Comments 3 way  -SW              Exercise 6    Exercise Name 6 SL hip Abd raises L  -SW      Reps 6 10  -SW               Exercise 7    Exercise Name 7 SL Hip Abd raise L with segmental elevation 2 step and 2 step down  -SW      Reps 7 10  -SW              Exercise 8    Exercise Name 8 clamshells  -SW      Sets 8 2  -SW      Reps 8 10  -SW              Exercise 9    Exercise Name 9 prone TKE  -SW      Reps 9 15  -SW              Exercise 10    Exercise Name 10 prone leg extension L  -SW      Reps 10 15  -SW              Exercise 11    Exercise Name 11 prone bent knee lifts (donkey kicks)  -SW      Reps 11 15  -SW              Exercise 12    Exercise Name 12 manual PNF patterns to LLE supine  -SW      Reps 12 15  -SW            User Key  (r) = Recorded By, (t) = Taken By, (c) = Cosigned By    Initials Name Provider Type    Rajani Pozo, PT DPT Physical Therapist              Manual Rx (last 36 hours)     Manual Treatments     Row Name 08/25/22 1100             Manual Rx 1    Manual Rx 1 Location alignment check  -SW              Manual Rx 2    Manual Rx 2 Location Lumbar and sacral spring assessment  -SW              Manual Rx 3    Manual Rx 3 Location piriformis deactivation bilaterally with reinforcement  -SW            User Key  (r) = Recorded By, (t) = Taken By, (c) = Cosigned By    Initials Name Provider Type    Rajani Pozo, PT DPT Physical Therapist                           PT OP Goals     Row Name 08/25/22 0900          PT Short Term Goals    STG Date to Achieve 09/08/22  -SW     STG 1 patient to be independent and compliant with HEP such that she performs at least once daily.  -SW     STG 1 Progress Met  -SW     STG 2 patient to present with alignment in supine position such that no MEt is required  -SW     STG 2 Progress Progressing  -SW     STG 2 Progress Comments needs consistency; good today  -     STG 3 patient to report pain as mild 3/10 or less with use of positioning and HEP  -SW     STG 3 Progress Progressing  -SW     STG 3 Progress Comments needs consistency; good today  -     STG 4 patient to  engage TA correctly and without cues for performance of t/f to provide stability for pelvic girdle.  -     STG 4 Progress Progressing  -     STG 5 patient to have resolved trigger points to piriformis and glut on L such that no radiation of pain is noted  -     STG 5 Progress Progressing  -            Long Term Goals    LTG Date to Achieve 10/06/22  -     LTG 1 patient to report improvement o 70% better overall since induction of PT  -     LTG 1 Progress New  -     LTG 2 patient to maintain normal work and caregiving activities without restrictions for full term of pregnancy  -     LTG 2 Progress New  -     LTG 3 patient to show improved stabilization such that ASLR is completed with good stabilization and no trunk roll.  -     LTG 3 Progress New  -            Time Calculation    PT Goal Re-Cert Due Date 09/08/22  -           User Key  (r) = Recorded By, (t) = Taken By, (c) = Cosigned By    Initials Name Provider Type    Rajani Pozo, PT DPT Physical Therapist                 Therapy Education  Given: HEP  Program: Reinforced, Progressed  How Provided: Verbal, Written, Demonstration  Provided to: Patient  Level of Understanding: Teach back education performed, Verbalized, Demonstrated              Time Calculation:   Start Time: 0918  Stop Time: 1028  Time Calculation (min): 70 min  Therapy Charges for Today     Code Description Service Date Service Provider Modifiers Qty    65092932066  PT MANUAL THERAPY EA 15 MIN 8/25/2022 Rajani Wells, PT DPT GP 2    09723799487 HC PT THER PROC EA 15 MIN 8/25/2022 Rajani Wells, PT DPT GP 3                    Rajani Wells PT DPT  8/25/2022

## 2022-09-01 ENCOUNTER — HOSPITAL ENCOUNTER (OUTPATIENT)
Dept: PHYSICAL THERAPY | Facility: HOSPITAL | Age: 32
Setting detail: THERAPIES SERIES
Discharge: HOME | End: 2022-09-01

## 2022-09-01 DIAGNOSIS — O26.893 PREGNANCY RELATED HIP PAIN IN THIRD TRIMESTER, ANTEPARTUM: Primary | ICD-10-CM

## 2022-09-01 DIAGNOSIS — M25.559 PREGNANCY RELATED HIP PAIN IN THIRD TRIMESTER, ANTEPARTUM: Primary | ICD-10-CM

## 2022-09-01 PROCEDURE — 97140 MANUAL THERAPY 1/> REGIONS: CPT | Performed by: PHYSICAL THERAPIST

## 2022-09-01 PROCEDURE — 97110 THERAPEUTIC EXERCISES: CPT | Performed by: PHYSICAL THERAPIST

## 2022-09-01 NOTE — THERAPY TREATMENT NOTE
Outpatient Physical Therapy Pelvic Health Progress Note  [facility]     Patient Name: Jazmine Liang  : 1990  MRN: [id number]  Today's Date: 2022        Visit Date: 2022  Visit number:   Recheck: 22  Insurance: Medicaid  Improvement: 50%    Visit Dx:    ICD-10-CM ICD-9-CM   1. 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 32 weeks  -SW      Type of Previous Deliveries Vaginal  -SW      Due Date 10/26/22  -SW              Pain Assessment    Pain Score 2  -SW      Post Pain Score 2  -SW              Education Provided On:    HEP Comments hip flexion stretch added and trunk rotation supine with open book rotation as well.  -SW            User Key  (r) = Recorded By, (t) = Taken By, (c) = Cosigned By    Initials Name Provider Type    Rajani Pozo, PT DPT Physical Therapist               PT Ortho     Row Name 22 0900       Subjective Comments    Subjective Comments i am doing good.  Pain in crevis on L hip is painful, directly in the bend of hip.  Pain onset noted this week.  Only felt when relaxed and trying to sleep.  No catch when she walks. Hurting some into R side buttock region, but L is feeling so much better.  I feel like I have stayed in good alignment for most part. No longer having pain during day, just at night.  Pain in groin region is achey/throbbing and not sharp, pins/needles.  -SW       Subjective Pain    Able to rate subjective  pain? yes  -    Pre-Treatment Pain Level 2  -    Post-Treatment Pain Level 2  -       Posture/Observations    Posture/Observations Comments good spirits this date.  No distress noted with sit to stands.  Gait non-antalgic.  -       Neural Tension Signs- Lower Quarter Clearing    Slump Negative  -    SLR Negative  -SW       Myotomal Screen- Lower Quarter Clearing    Hip flexion (L2) 4 (Good)  -SW    Knee extension (L3) 5 (Normal)  -SW    Knee flexion (S2) 5 (Normal)  -       Lumbar ROM Screen- Lower Quarter Clearing    Lumbar Flexion Normal  -SW    Lumbar Extension Normal  -SW    Lumbar Lateral Flexion Normal  -SW    Lumbar Rotation Normal  -SW       SI/Hip Screen- Lower Quarter Clearing    ASIS compression Negative  -    Anastasiia's/Dylan's test Negative  -SW       Lumbosacral Accessory Motions    Innominate rotation --  slight rotation ant on R side supine  -       Lumbosacral Palpation    Iliopsoas Left:;Tender;Guarded/taut  presented more at sartosious attachments and psoas portions of muscle at proximal ends.  -    Lumbosacral Palpation Comments Patient with tenderness noted along sartorious at proximal end.  Also tender along iliopsoas attachment as it approaches under pelvic rim.  Palpable clicking noted with hip flexion on L side. Both with taut bands that caused radiation of pain toward pubis.  Adductor triggers also noted this date on LLE.  -       General ROM    GENERAL ROM COMMENTS functional, but discomfort with clicking under pelvic rim attachment.  -       MMT (Manual Muscle Testing)    General MMT Comments cont with 4/5 hip abd and glut med, 4/5 hip flexion on LLE.  others WFL  -          User Key  (r) = Recorded By, (t) = Taken By, (c) = Cosigned By    Initials Name Provider Type    SW Rajani Wells, PT DPT Physical Therapist                            PT Assessment/Plan     Row Name 09/01/22 1300          PT Assessment    Assessment Comments STG 1, 3, 4, and 5 met at this  time.  Pt showing inc tolerance to activity for most of day.  Night times have become a challenge due to new onset of L groin pain suspected from irritation / restriction of iliopsoas or sartorious muscles.  She is faithful to HEP and managing posturally what she can.  Varicose veins appear managed at this time.  Progressing well.  She is no longer having pain on L side.  -SW     Rehab Potential Good  -SW     Patient/caregiver participated in establishment of treatment plan and goals Yes  -SW     Patient would benefit from skilled therapy intervention Yes  -SW            PT Plan    PT Frequency 1x/week  -SW     PT Plan Comments FU in 2 weeks.  cont with manual work to iliopsoas and sartorious as needed.  Consideration given to stabilization in quadruped.  -SW           User Key  (r) = Recorded By, (t) = Taken By, (c) = Cosigned By    Initials Name Provider Type    Rajani Pozo, PT DPT Physical Therapist                   OP Exercises     Row Name 09/01/22 0900             Subjective Comments    Subjective Comments i am doing good.  Pain in crevis on L hip is painful, directly in the bend of hip.  Pain onset noted this week.  Only felt when relaxed and trying to sleep.  No catch when she walks. Hurting some into R side buttock region, but L is feeling so much better.  I feel like I have stayed in good alignment for most part. No longer having pain during day, just at night.  Pain in groin region is achey/throbbing and not sharp, pins/needles.  -SW              Subjective Pain    Able to rate subjective pain? yes  -SW      Pre-Treatment Pain Level 2  -SW      Post-Treatment Pain Level 2  -SW              Exercise 1    Exercise Name 1 angry cat strech  -SW      Reps 1 10  -SW      Time 1 5 sec  -SW              Exercise 2    Exercise Name 2 open book stretch  -SW      Reps 2 10  -SW      Time 2 5 sec  -SW      Additional Comments bilaterally  -SW              Exercise 3    Exercise Name 3 lower trunk rotation   -SW      Reps 3 10  -SW              Exercise 4    Exercise Name 4 prone hip ext on R  -SW      Reps 4 10  -SW              Exercise 5    Exercise Name 5 prone opp arm/leg raise  -SW      Reps 5 10  -SW              Exercise 6    Exercise Name 6 hip flexor stretch  -SW      Reps 6 3  -SW      Time 6 30 sec  -SW            User Key  (r) = Recorded By, (t) = Taken By, (c) = Cosigned By    Initials Name Provider Type    SW Rajani Wells, PT DPT Physical Therapist              Manual Rx (last 36 hours)     Manual Treatments     Row Name 09/01/22 1300             Manual Rx 1    Manual Rx 1 Location alignment check with MET R ant rotation  -SW              Manual Rx 2    Manual Rx 2 Location Piriformis deactivation and reinforcement on R  -SW              Manual Rx 3    Manual Rx 3 Location iliopsoas L deactivation and reinforcement  -SW              Manual Rx 4    Manual Rx 4 Location Sartorious L deactivation and reinforcment  -SW            User Key  (r) = Recorded By, (t) = Taken By, (c) = Cosigned By    Initials Name Provider Type    Rajani Pozo, PT DPT Physical Therapist                           PT OP Goals     Row Name 09/01/22 1300          PT Short Term Goals    STG Date to Achieve 09/29/22  -SW     STG 1 patient to be independent and compliant with HEP such that she performs at least once daily.  -SW     STG 1 Progress Met  -SW     STG 2 patient to present with alignment in supine position such that no MEt is required  -SW     STG 2 Progress Progressing  -SW     STG 3 patient to report pain as mild 3/10 or less with use of positioning and HEP  -SW     STG 3 Progress Met  -     STG 3 Progress Comments consistently below 3/10 all days at this time.  -SW     STG 4 patient to engage TA correctly and without cues for performance of t/f to provide stability for pelvic girdle.  -SW     STG 4 Progress Met  -     STG 4 Progress Comments actively engages TA without cues for transitions  -     STG  5 patient to have resolved trigger points to piriformis and glut on L such that no radiation of pain is noted  -     STG 5 Progress Met  -     STG 5 Progress Comments met on L side  -SW            Long Term Goals    LTG Date to Achieve 10/06/22  -     LTG 1 patient to report improvement o 70% better overall since induction of PT  -     LTG 1 Progress New  -     LTG 2 patient to maintain normal work and caregiving activities without restrictions for full term of pregnancy  -     LTG 2 Progress New  -     LTG 3 patient to show improved stabilization such that ASLR is completed with good stabilization and no trunk roll.  -     LTG 3 Progress New  -            Time Calculation    PT Goal Re-Cert Due Date 09/29/22  -           User Key  (r) = Recorded By, (t) = Taken By, (c) = Cosigned By    Initials Name Provider Type    Rajani Pozo, PT DPT Physical Therapist                 Therapy Education  Given: HEP, Symptoms/condition management  Program: Reinforced, Progressed  How Provided: Verbal, Demonstration  Provided to: Patient  Level of Understanding: Teach back education performed, Demonstrated, Verbalized              Time Calculation:   Start Time: 0916  Stop Time: 1025  Time Calculation (min): 69 min  Therapy Charges for Today     Code Description Service Date Service Provider Modifiers Qty    94261425402 HC PT MANUAL THERAPY EA 15 MIN 9/1/2022 Rajani Wells, PT DPT GP 3    01494067053 HC PT THER PROC EA 15 MIN 9/1/2022 Rajani Wells, PT DPT GP 2                    Rajani Wells PT DPT  9/1/2022

## 2022-09-06 ENCOUNTER — ROUTINE PRENATAL (OUTPATIENT)
Dept: OBSTETRICS AND GYNECOLOGY | Facility: CLINIC | Age: 32
End: 2022-09-06

## 2022-09-06 VITALS — BODY MASS INDEX: 27.29 KG/M2 | SYSTOLIC BLOOD PRESSURE: 100 MMHG | WEIGHT: 164 LBS | DIASTOLIC BLOOD PRESSURE: 60 MMHG

## 2022-09-06 DIAGNOSIS — Z3A.32 32 WEEKS GESTATION OF PREGNANCY: Primary | ICD-10-CM

## 2022-09-06 DIAGNOSIS — M25.252 LAXITY OF LEFT HIP: ICD-10-CM

## 2022-09-06 DIAGNOSIS — R82.71 GBS BACTERIURIA: ICD-10-CM

## 2022-09-06 DIAGNOSIS — O09.93 SUPERVISION OF HIGH RISK PREGNANCY IN THIRD TRIMESTER: ICD-10-CM

## 2022-09-06 DIAGNOSIS — O36.1930 MATERNAL RED CELL ALLOIMMUNIZATION IN THIRD TRIMESTER, ANTEPARTUM, NOT APPLICABLE OR UNSPECIFIED FETUS: ICD-10-CM

## 2022-09-06 PROCEDURE — 0502F SUBSEQUENT PRENATAL CARE: CPT | Performed by: NURSE PRACTITIONER

## 2022-09-06 NOTE — PROGRESS NOTES
"CC: Prenatal visit    Jazmine Liang is a 31 y.o.  at 32w6d.  Doing well.  No complaints, physical therapy is helping with hip dislocation and pain.  Denies contractions, LOF, or VB.  Reports good FM.    /60   Wt 74.4 kg (164 lb)   LMP 2022 (Approximate)   BMI 27.29 kg/m²             3/22 Problems (from 22 to present)     Problem Noted Resolved    Laxity of left hip 2022 by Diana Obrien MD No    Overview Signed 2022 12:49 PM by Diana Obrien MD     Seeing Rajani during pregnancy  Delivery recommendations: \"Extreme ER of LLE may be a precaution for you all to consider. Suggested the thought of sidelying delivery to avoid risk of subluxation and protection of LLE.  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  at 32w6d  US today with TSP  - RTC in 1 weeks     Diagnosis Plan   1. 32 weeks gestation of pregnancy     2. Supervision of high risk pregnancy in third trimester     3. Maternal red cell alloimmunization in third trimester, antepartum, not applicable or unspecified fetus     4. GBS bacteriuria     5. Laxity of left " CLAU Regalado  9/6/2022  08:56 CDT

## 2022-09-15 ENCOUNTER — HOSPITAL ENCOUNTER (OUTPATIENT)
Dept: PHYSICAL THERAPY | Facility: HOSPITAL | Age: 32
Setting detail: THERAPIES SERIES
Discharge: HOME | End: 2022-09-15

## 2022-09-15 ENCOUNTER — ROUTINE PRENATAL (OUTPATIENT)
Dept: OBSTETRICS AND GYNECOLOGY | Facility: CLINIC | Age: 32
End: 2022-09-15

## 2022-09-15 VITALS — BODY MASS INDEX: 27.46 KG/M2 | DIASTOLIC BLOOD PRESSURE: 64 MMHG | WEIGHT: 165 LBS | SYSTOLIC BLOOD PRESSURE: 110 MMHG

## 2022-09-15 DIAGNOSIS — O09.93 SUPERVISION OF HIGH RISK PREGNANCY IN THIRD TRIMESTER: Primary | ICD-10-CM

## 2022-09-15 DIAGNOSIS — O26.893 PREGNANCY RELATED HIP PAIN IN THIRD TRIMESTER, ANTEPARTUM: Primary | ICD-10-CM

## 2022-09-15 DIAGNOSIS — M25.559 PREGNANCY RELATED HIP PAIN IN THIRD TRIMESTER, ANTEPARTUM: Primary | ICD-10-CM

## 2022-09-15 DIAGNOSIS — Z3A.34 34 WEEKS GESTATION OF PREGNANCY: ICD-10-CM

## 2022-09-15 DIAGNOSIS — M25.252 LAXITY OF LEFT HIP: ICD-10-CM

## 2022-09-15 DIAGNOSIS — R82.71 GBS BACTERIURIA: ICD-10-CM

## 2022-09-15 DIAGNOSIS — O36.1930 MATERNAL RED CELL ALLOIMMUNIZATION IN THIRD TRIMESTER, ANTEPARTUM, NOT APPLICABLE OR UNSPECIFIED FETUS: ICD-10-CM

## 2022-09-15 PROCEDURE — 97140 MANUAL THERAPY 1/> REGIONS: CPT | Performed by: PHYSICAL THERAPIST

## 2022-09-15 PROCEDURE — 99213 OFFICE O/P EST LOW 20 MIN: CPT | Performed by: OBSTETRICS & GYNECOLOGY

## 2022-09-15 PROCEDURE — 97535 SELF CARE MNGMENT TRAINING: CPT | Performed by: PHYSICAL THERAPIST

## 2022-09-15 NOTE — PROGRESS NOTES
"CC: Prenatal visit    Jazmine Liang is a 31 y.o.  at 34w1d.  Doing well.  Denies contractions, LOF, or VB.  Reports good FM.  She has had leg cramps and RLS.    /64   Wt 74.8 kg (165 lb)   LMP 2022 (Approximate)   BMI 27.46 kg/m²   Fundal Height (cm): 34 cm  Fetal Heart Rate: 139    3/22 Problems (from 22 to present)     Problem Noted Resolved    Laxity of left hip 2022 by Diana Obrien MD No    Overview Signed 2022 12:49 PM by Diana Obrien MD     Seeing Rajani during pregnancy  Delivery recommendations: \"Extreme ER of LLE may be a precaution for you all to consider. Suggested the thought of sidelying delivery to avoid risk of subluxation and protection of LLE.  I have recommended her to consider ortho consult post delivery to rule out labral insufficiency vs tear due to continual complaints.\"         Supervision of high risk pregnancy in third trimester 3/23/2022 by Delmi Bynum APRN No    Maternal red cell alloimmunization in third trimester, antepartum, not applicable or unspecified fetus 3/23/2022 by Delmi Bynum APRN No    Overview Signed 2022  1:39 PM by Diana Obrien MD     S/p TSP MFM consult; declined amniocentesis  Weekly MCA dopplers  Weekly BPP/DORI starting at 32 weeks  Delivery at 39 wks         GBS bacteriuria 3/23/2022 by Delmi Bynum APRN No    Nausea and vomiting during pregnancy prior to 22 weeks gestation 3/23/2022 by Delmi Bynum APRN 2022 by Delmi Bynum APRN        A/P: Jazmine Liang is a 31 y.o.  at 34w1d.  - RTC in 2 weeks w/ GBS  - Declines IOL at 39 weeks and plans for spontaneous labor  - Recommend magnesium, Benadryl for leg cramps, RLS  - Reviewed COVID-19 visitation policy  - Reviewed COVID-19 precautions     Diagnosis Plan   1. Supervision of high risk pregnancy in third trimester     2. Maternal red " cell alloimmunization in third trimester, antepartum, not applicable or unspecified fetus     3. GBS bacteriuria     4. Laxity of left hip     5. 34 weeks gestation of pregnancy       Diana Obrien MD  9/15/2022  15:25 CDT

## 2022-09-15 NOTE — THERAPY TREATMENT NOTE
Outpatient Physical Therapy Pelvic Health Treatment Note  [facility]     Patient Name: Jazmine Liang  : 1990  MRN: [id number]  Today's Date: 9/15/2022        Visit Date: 09/15/2022   Visit number:   Recheck: 22  Insurance: Medicaid      Visit Dx:    ICD-10-CM ICD-9-CM   1. 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 34 weeks  -SW      Type of Previous Deliveries Vaginal  -SW      Due Date 10/26/22  -SW              Pain Assessment    Pain Score 3  -SW      Post Pain Score 2  -SW            User Key  (r) = Recorded By, (t) = Taken By, (c) = Cosigned By    Initials Name Provider Type    SW Rajani Wells, PT DPT Physical Therapist               PT Ortho     Row Name 09/15/22 1300       Subjective Comments    Subjective Comments Past two days has flared along groin on L side.  More R side LB.  Those are trigger areas I have now.  Swelling to vaginal area is getting worse.  Having to wear compression underwear during the day most all days. Complains of muscle cramping a lot at night.  Mainly to LE.  No history of current anemia, but has been close following delivery of last child.  Really aggravating at night with cramps.  Pt believes she is going to just need to have this baby in order for s/s to improve.  -SW       Subjective Pain    Able to rate subjective pain? yes  -SW    Pre-Treatment Pain Level 3  -SW    Post-Treatment Pain Level 2  -SW    Subjective Pain  Comment feels better post treament.  -SW       Posture/Observations    Posture/Observations Comments ambulates with slight ER at hips bilaterally. Alert and oriented. Pt intolerant to supine positioning this date.  -SW       Neural Tension Signs- Lower Quarter Clearing    Slump Negative  -SW    SLR Negative  -SW       Lumbosacral Accessory Motions    Innominate rotation --  ant rotation in supine on R  -SW       Lumbosacral Palpation    Piriformis Right:;Tender;Guarded/taut  tissue very dense.  Dec motility noted.  Fascial restrictions to region.  -SW    Gluteus Milan Right:;Tender;Guarded/taut  -SW    Iliopsoas Left:;Tender  -SW    Lumbosacral Palpation Comments Pt continues with iliopsoas irritation on L and piriformis and gluteal tenderness on R. Tissue to region is dense and without good motility. No radiation of pain, but localized tenderness and achiness reported.  -SW          User Key  (r) = Recorded By, (t) = Taken By, (c) = Cosigned By    Initials Name Provider Type    Rajani Pozo, PT DPT Physical Therapist                            PT Assessment/Plan     Row Name 09/15/22 1300          PT Assessment    Assessment Comments Patient presents this date with R glut/piriformis pain and L iliopsoas pain.  Manual work to iliopsoas on back caused nausea feeling with pt requesting to transition to SL.  She recovered quickly but had s/s of venous cava pressure response. R buttock region with fascial restriction in mobility.  However, patient without radiation of s/s and just regional, localized tenderness.  Post manual work, patient responded a feeling of improvement, but manually remained restricted in motility of tissue.  Stablizing with pregnancy. She requested no exercise this date as she stretches all day long at home.  FU with MD today.  Will consult MD re: muscle cramps etc. STG 1, 3-5 met.  Progressing with other goals.  -SW     Rehab Potential Good  -SW     Patient/caregiver participated in  establishment of treatment plan and goals Yes  -     Patient would benefit from skilled therapy intervention Yes  -            PT Plan    PT Frequency 1x/week  -     PT Plan Comments FU with MD today. cont manual work as able.  -           User Key  (r) = Recorded By, (t) = Taken By, (c) = Cosigned By    Initials Name Provider Type    Rajani Pozo, PT DPT Physical Therapist                   OP Exercises     Row Name 09/15/22 1300             Subjective Comments    Subjective Comments Past two days has flared along groin on L side.  More R side LB.  Those are trigger areas I have now.  Swelling to vaginal area is getting worse.  Having to wear compression underwear during the day most all days. Complains of muscle cramping a lot at night.  Mainly to LE.  No history of current anemia, but has been close following delivery of last child.  Really aggravating at night with cramps.  Pt believes she is going to just need to have this baby in order for s/s to improve.  -              Subjective Pain    Able to rate subjective pain? yes  -      Pre-Treatment Pain Level 3  -      Post-Treatment Pain Level 2  -      Subjective Pain Comment feels better post treament.  -              Exercise 1    Exercise Name 1 reviewed stretching program only.  -      Additional Comments pt requested no stretching program this date.  Prefers manual work only.  -              Exercise 2    Exercise Name 2 LE cramping  -      Additional Comments discussed cramping s/s per pt request. Ensured proper hydration intake to r/o dehydration.  No recent s/s of anemia.  Consumes regular prenatal Vitamins with iron supplement included.  Instructed to discuss with MD.  In past, have prescribed Mg supplementation or Benedryl for night rest.  -            User Key  (r) = Recorded By, (t) = Taken By, (c) = Cosigned By    Initials Name Provider Type    Rajani Pozo, PT DPT Physical Therapist              Manual  Rx (last 36 hours)     Manual Treatments     Row Name 09/15/22 1300             Manual Rx 1    Manual Rx 1 Location alignment check with MET R ant rotation  -SW              Manual Rx 2    Manual Rx 2 Location Piriformisglut deactivation and reinforcement on R  -SW              Manual Rx 3    Manual Rx 3 Location iliopsoas L deactivation and reinforcement  -              Manual Rx 4    Manual Rx 4 Location LB/sacral STM and MFR  -SW            User Key  (r) = Recorded By, (t) = Taken By, (c) = Cosigned By    Initials Name Provider Type    Rajani Pozo, PT DPT Physical Therapist                           PT OP Goals     Row Name 09/15/22 1300          PT Short Term Goals    STG Date to Achieve 09/29/22  -     STG 1 patient to be independent and compliant with HEP such that she performs at least once daily.  -SW     STG 1 Progress Met  -     STG 2 patient to present with alignment in supine position such that no MEt is required  -SW     STG 2 Progress Progressing  -     STG 2 Progress Comments still wax and wane with stability to pelvic girdle.  -SW     STG 3 patient to report pain as mild 3/10 or less with use of positioning and HEP  -     STG 3 Progress Met  -     STG 4 patient to engage TA correctly and without cues for performance of t/f to provide stability for pelvic girdle.  -     STG 4 Progress Met  -     STG 5 patient to have resolved trigger points to piriformis and glut on L such that no radiation of pain is noted  -     STG 5 Progress Met  -            Long Term Goals    LTG Date to Achieve 10/06/22  -SW     LTG 1 patient to report improvement o 70% better overall since induction of PT  -SW     LTG 1 Progress Progressing  -     LTG 2 patient to maintain normal work and caregiving activities without restrictions for full term of pregnancy  -     LTG 2 Progress Progressing  -     LTG 3 patient to show improved stabilization such that ASLR is completed with good  stabilization and no trunk roll.  -SW     LTG 3 Progress New  -            Time Calculation    PT Goal Re-Cert Due Date 09/29/22  -           User Key  (r) = Recorded By, (t) = Taken By, (c) = Cosigned By    Initials Name Provider Type    Rajani Pozo, PT DPT Physical Therapist                                Time Calculation:   Start Time: 1310  Stop Time: 1408  Time Calculation (min): 58 min  Therapy Charges for Today     Code Description Service Date Service Provider Modifiers Qty    86908473625 HC PT MANUAL THERAPY EA 15 MIN 9/15/2022 Rajani Wells, PT DPT GP 3    74875637592 HC PT SELF CARE/MGMT/TRAIN EA 15 MIN 9/15/2022 Rajani Wells, PT DPT GP 1                    Rajani Wells, PT DPT  9/15/2022

## 2022-09-22 ENCOUNTER — TELEPHONE (OUTPATIENT)
Dept: OBSTETRICS AND GYNECOLOGY | Facility: CLINIC | Age: 32
End: 2022-09-22

## 2022-09-22 ENCOUNTER — HOSPITAL ENCOUNTER (OUTPATIENT)
Dept: PHYSICAL THERAPY | Facility: HOSPITAL | Age: 32
Setting detail: THERAPIES SERIES
Discharge: HOME | End: 2022-09-22

## 2022-09-22 DIAGNOSIS — M25.559 PREGNANCY RELATED HIP PAIN IN THIRD TRIMESTER, ANTEPARTUM: Primary | ICD-10-CM

## 2022-09-22 DIAGNOSIS — O26.893 PREGNANCY RELATED HIP PAIN IN THIRD TRIMESTER, ANTEPARTUM: Primary | ICD-10-CM

## 2022-09-22 PROCEDURE — 97140 MANUAL THERAPY 1/> REGIONS: CPT | Performed by: PHYSICAL THERAPIST

## 2022-09-22 NOTE — THERAPY TREATMENT NOTE
Outpatient Physical Therapy Pelvic Health Progress Note  [facility]     Patient Name: Jazmine Liang  : 1990  MRN: [id number]  Today's Date: 2022        Visit Date: 2022   Visit number:   Recheck: 10/20/22  Insurance: Medicaid      Visit Dx:    ICD-10-CM ICD-9-CM   1. 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 35 weeks  -SW      Type of Previous Deliveries Vaginal  -SW              Pain Assessment    Pain Score 2  -SW      Post Pain Score 2  -SW              Lumbosacral Accessory Motions    Innominate rotation --  slight ant rotation on R but very insignificant.  Pt asymmptomatic and therefore no MET completed.  -SW            User Key  (r) = Recorded By, (t) = Taken By, (c) = Cosigned By    Initials Name Provider Type    Rajani Pozo, PT DPT Physical Therapist               PT Ortho     Row Name 22       Subjective Comments    Subjective Comments Went Monday to MD.  Told her that she is going to have a big baby. Fetus already weighs 6+lbs.  Taking Magnesium for cramps and doing so much better.  Has tried to watch her fluid intake.  I drink a lot of tea vs water.  I struggle with reflux and water makes it worse.  Concerned with post partum anemia.  Concerned with antigens in blood being mixed with other blood that she may receive via infusion if needed.  I worry I will be given the wrong kind.  Need to discuss with MD.  I would really  like to give my own blood but not sure that is allowed considering I am pregnant.  Plans to discuss with MD asap. Pain to buttock is much better.  L hip is stilll there, but working with it brings irritation.  I think I want to leave that part alone today.  -       Subjective Pain    Able to rate subjective pain? yes  -    Post-Treatment Pain Level 2  -       Posture/Observations    Posture/Observations Comments normal sit to stand with unremarkable gait.  Alignment with good position.  Very slight ant innom on R side.  -SW       Myotomal Screen- Lower Quarter Clearing    Hip flexion (L2) 4 (Good)  -SW    Knee extension (L3) 5 (Normal)  -SW    Knee flexion (S2) 5 (Normal)  -SW       Lumbar ROM Screen- Lower Quarter Clearing    Lumbar Flexion Normal  -SW    Lumbar Extension Normal  -SW    Lumbar Lateral Flexion Normal  -SW    Lumbar Rotation Normal  -       Lumbosacral Palpation    Lumbosacral Palpation? Yes  -    SI --  good positional alignment.  Good AP glide  -SW    Piriformis Right:;Tender  thickened muscle tension noted along distal end of muscle. Improved along mid muscle belly and proximal attachment.  No radiation of pain with palpation, but taut band identified.  -SW    Gluteus Milan Right:;Tender  -SW    Lumbosacral Palpation Comments Pt continues with clinical presentation of R piriformis myofascial trigger point.  Taut band identified but without radiation of s/s.  Presents more along the distal attachment.  Mid muscle belly and proximal end improved.  -          User Key  (r) = Recorded By, (t) = Taken By, (c) = Cosigned By    Initials Name Provider Type    Rajani Pozo, PT DPT Physical Therapist                            PT Assessment/Plan     Row Name 09/22/22 0900          PT Assessment    Assessment Comments Patient is managing well this week.  Back and buttock is feeling a lot better.  Still with tension and taut band toward distal end of piriformis.  Pt had difficulty with  tolerance in prone position this date with symptoms of nausea noted.  Repositioned post rest/recovery and was able to return to prone pregnancy pillow without problem.  Managing well. STG 1, 3-5 met and LTG 2 met.  -SW     Rehab Potential Good  -SW     Patient/caregiver participated in establishment of treatment plan and goals Yes  -SW     Patient would benefit from skilled therapy intervention Yes  -SW            PT Plan    PT Frequency 1x/week  -     PT Plan Comments cont with manual work as needed.  Add SL clamshells and hip ABd to next visit.  -SW           User Key  (r) = Recorded By, (t) = Taken By, (c) = Cosigned By    Initials Name Provider Type    SW Rajani Wells, PT DPT Physical Therapist                   OP Exercises     Row Name 09/22/22 0900             Subjective Comments    Subjective Comments Went Monday to MD.  Told her that she is going to have a big baby. Fetus already weighs 6+lbs.  Taking Magnesium for cramps and doing so much better.  Has tried to watch her fluid intake.  I drink a lot of tea vs water.  I struggle with reflux and water makes it worse.  Concerned with post partum anemia.  Concerned with antigens in blood being mixed with other blood that she may receive via infusion if needed.  I worry I will be given the wrong kind.  Need to discuss with MD.  I would really like to give my own blood but not sure that is allowed considering I am pregnant.  Plans to discuss with MD asap. Pain to buttock is much better.  L hip is stilll there, but working with it brings irritation.  I think I want to leave that part alone today.  -SW              Subjective Pain    Able to rate subjective pain? yes  -SW      Pre-Treatment Pain Level 2  -SW      Post-Treatment Pain Level 2  -SW              Exercise 1    Exercise Name 1 hydration calculation  -SW      Additional Comments encouraged patient to maintain 2/3 intake as water or other hydrating substance and dec tea and other dehydrators to  1/3 amount of total intake.  She is using Mg for cramps as well and feels that is managing good.  -              Exercise 2    Exercise Name 2 Question re: blood infusion  -      Additional Comments consultation given to MD re: question of patient.  Per MD, due to antigens present in blood type, pt will have blood pulled specific for her blood type that will NOT be contraindicated to those she currently has.  Presented patient's idea of giving her own blood prior to delivery.  MD will consider after looking at current hg/hematocrit levels. Pt phoned with information to put her at ease of concerns.  -              Exercise 3    Exercise Name 3 Monitoring  -      Additional Comments during manual work to piriformis on R side, patient became symptomatic with prone position in a pregnancy pillow.  She felt flush, warm and nauseated.  She was transitioned to seated position whereby symptoms quickly resolved.  Patient monitored closely and given water.  She was able to return without complaints to prone pillow position but removed pillow insert support to dec any unforeseen pressure to abdominal wall. Pt was able to complete treatment without adverse effects.  -            User Key  (r) = Recorded By, (t) = Taken By, (c) = Cosigned By    Initials Name Provider Type    Rajani Pozo, PT DPT Physical Therapist              Manual Rx (last 36 hours)     Manual Treatments     Row Name 09/22/22 0900             Manual Rx 1    Manual Rx 1 Location alignment check  -              Manual Rx 2    Manual Rx 2 Location piriformis R deactivation to distal end with reinforcement  -              Manual Rx 3    Manual Rx 3 Location STM to post hip and buttock region.  -            User Key  (r) = Recorded By, (t) = Taken By, (c) = Cosigned By    Initials Name Provider Type    Rajani Pozo PT DPT Physical Therapist                           PT OP Goals     Row Name 09/22/22 0900          PT Short Term  Goals    STG Date to Achieve 10/20/22  -     STG 1 patient to be independent and compliant with HEP such that she performs at least once daily.  -     STG 1 Progress Met  -     STG 2 patient to present with alignment in supine position such that no MEt is required  -     STG 2 Progress Progressing  -     STG 3 patient to report pain as mild 3/10 or less with use of positioning and HEP  -     STG 3 Progress Met  -     STG 4 patient to engage TA correctly and without cues for performance of t/f to provide stability for pelvic girdle.  -     STG 4 Progress Met  -     STG 5 patient to have resolved trigger points to piriformis and glut on L such that no radiation of pain is noted  -     STG 5 Progress Met  -            Long Term Goals    LTG Date to Achieve 10/06/22  -     LTG 1 patient to report improvement o 70% better overall since induction of PT  -     LTG 1 Progress Progressing  -     LTG 2 patient to maintain normal work and caregiving activities without restrictions for full term of pregnancy  -     LTG 2 Progress Met  -     LTG 3 patient to show improved stabilization such that ASLR is completed with good stabilization and no trunk roll.  -     LTG 3 Progress Progressing  -            Time Calculation    PT Goal Re-Cert Due Date 10/20/22  -           User Key  (r) = Recorded By, (t) = Taken By, (c) = Cosigned By    Initials Name Provider Type    Rajani Pozo, PT DPT Physical Therapist                 Therapy Education  Given: HEP, Symptoms/condition management  Program: Reinforced, Progressed  How Provided: Verbal  Provided to: Patient  Level of Understanding: Teach back education performed, Verbalized              Time Calculation:   Start Time: 0918  Stop Time: 1015  Time Calculation (min): 57 min  Therapy Charges for Today     Code Description Service Date Service Provider Modifiers Qty    95055289378 HC PT MANUAL THERAPY EA 15 MIN 9/22/2022 Rajani Wells  Tab, PT DPT GP 3    [id number]  PT THER SUPP EA 15 MIN 9/22/2022 Rajnai Wells, PT DPT GP 1                    Rajani Wells, PT DPT  9/22/2022

## 2022-09-22 NOTE — TELEPHONE ENCOUNTER
I called and spoke with patient about concerns. Per the lab we will need Dr. Mendoza permission we attempted to contact him and he was unavailable.  I also discussed with her that being as she is borderline anemic at 12.3 that even with the lab on board that it may not be and option due to her blood counts.

## 2022-09-22 NOTE — TELEPHONE ENCOUNTER
----- Message from Jazmine Liang sent at 9/22/2022 12:55 PM CDT -----  Regarding: Blood  I know Rajani Wells spoke to you today about my blood concerns. I see Delmi Bynum on Tuesday, I was wondering if we could check my iron levels then and see if it’s safe for me to give blood. She explained in case of me needing blood it was be screened, however if it’s an option I would prefer to use my own blood.

## 2022-09-27 ENCOUNTER — ROUTINE PRENATAL (OUTPATIENT)
Dept: OBSTETRICS AND GYNECOLOGY | Facility: CLINIC | Age: 32
End: 2022-09-27

## 2022-09-27 VITALS — SYSTOLIC BLOOD PRESSURE: 108 MMHG | BODY MASS INDEX: 28.79 KG/M2 | DIASTOLIC BLOOD PRESSURE: 62 MMHG | WEIGHT: 173 LBS

## 2022-09-27 DIAGNOSIS — M25.252 LAXITY OF LEFT HIP: ICD-10-CM

## 2022-09-27 DIAGNOSIS — O36.1930 MATERNAL RED CELL ALLOIMMUNIZATION IN THIRD TRIMESTER, ANTEPARTUM, NOT APPLICABLE OR UNSPECIFIED FETUS: ICD-10-CM

## 2022-09-27 DIAGNOSIS — O09.93 SUPERVISION OF HIGH RISK PREGNANCY IN THIRD TRIMESTER: ICD-10-CM

## 2022-09-27 DIAGNOSIS — R82.71 GBS BACTERIURIA: ICD-10-CM

## 2022-09-27 DIAGNOSIS — Z3A.35 35 WEEKS GESTATION OF PREGNANCY: Primary | ICD-10-CM

## 2022-09-27 PROCEDURE — 99213 OFFICE O/P EST LOW 20 MIN: CPT | Performed by: NURSE PRACTITIONER

## 2022-09-27 NOTE — PROGRESS NOTES
"CC: Prenatal visit    Jazmine Liang is a 32 y.o.  at 35w6d.  Doing well.  She is getting more uncomfortable.  Compression panties have helped vulvar varicosities.  Denies contractions, LOF, or VB.  Reports good FM.    /62   Wt 78.5 kg (173 lb)   LMP 2022 (Approximate)   BMI 28.79 kg/m²   US preliminary- placenta right lateral, BPP , DORI 19.5 cm, MVP 5.6 cm, MCA dopplers WNL     Fetal Heart Rate: 145    3/22 Problems (from 22 to present)     Problem Noted Resolved    Laxity of left hip 2022 by Diana Obrien MD No    Overview Signed 2022 12:49 PM by Diana Obrien MD     Seeing Rajani during pregnancy  Delivery recommendations: \"Extreme ER of LLE may be a precaution for you all to consider. Suggested the thought of sidelying delivery to avoid risk of subluxation and protection of LLE.  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  at 35w6d.   - RTC in 1 weeks     Diagnosis Plan   1. 35 weeks gestation of pregnancy     2. Supervision of high risk pregnancy in third trimester     3. Maternal red cell alloimmunization in " third trimester, antepartum, not applicable or unspecified fetus  Called and spoke with Dr. Méndez regarding pt desire to donate blood to avoid donor blood transfusion.  He is thinking about doing type and screen along with blood phenotyping at 38 weeks, but will get back with me.  Due to impending delivery he does not recommended she donate blood due to inadequate time for red blood cells to increase back to baseline.   4. GBS bacteriuria     5. Laxity of left hip         Delmi Bynum, CLAU  9/27/2022  15:39 CDT

## 2022-09-29 ENCOUNTER — APPOINTMENT (OUTPATIENT)
Dept: PHYSICAL THERAPY | Facility: HOSPITAL | Age: 32
End: 2022-09-29

## 2022-10-05 DIAGNOSIS — O36.1930 MATERNAL RED CELL ALLOIMMUNIZATION IN THIRD TRIMESTER, ANTEPARTUM, NOT APPLICABLE OR UNSPECIFIED FETUS: Primary | ICD-10-CM

## 2022-10-06 ENCOUNTER — APPOINTMENT (OUTPATIENT)
Dept: PHYSICAL THERAPY | Facility: HOSPITAL | Age: 32
End: 2022-10-06

## 2022-10-06 ENCOUNTER — LAB (OUTPATIENT)
Dept: LAB | Facility: HOSPITAL | Age: 32
End: 2022-10-06

## 2022-10-06 ENCOUNTER — ROUTINE PRENATAL (OUTPATIENT)
Dept: OBSTETRICS AND GYNECOLOGY | Facility: CLINIC | Age: 32
End: 2022-10-06

## 2022-10-06 VITALS — SYSTOLIC BLOOD PRESSURE: 112 MMHG | DIASTOLIC BLOOD PRESSURE: 66 MMHG | WEIGHT: 167 LBS | BODY MASS INDEX: 27.79 KG/M2

## 2022-10-06 DIAGNOSIS — O36.1930 MATERNAL RED CELL ALLOIMMUNIZATION IN THIRD TRIMESTER, ANTEPARTUM, NOT APPLICABLE OR UNSPECIFIED FETUS: ICD-10-CM

## 2022-10-06 DIAGNOSIS — M25.252 LAXITY OF LEFT HIP: ICD-10-CM

## 2022-10-06 DIAGNOSIS — Z3A.37 37 WEEKS GESTATION OF PREGNANCY: Primary | ICD-10-CM

## 2022-10-06 DIAGNOSIS — R82.71 GBS BACTERIURIA: ICD-10-CM

## 2022-10-06 DIAGNOSIS — O09.93 SUPERVISION OF HIGH RISK PREGNANCY IN THIRD TRIMESTER: ICD-10-CM

## 2022-10-06 LAB
ABO GROUP BLD: NORMAL
BLD GP AB SCN SERPL QL: POSITIVE
RH BLD: POSITIVE
T&S EXPIRATION DATE: NORMAL

## 2022-10-06 PROCEDURE — 86901 BLOOD TYPING SEROLOGIC RH(D): CPT

## 2022-10-06 PROCEDURE — 99213 OFFICE O/P EST LOW 20 MIN: CPT | Performed by: NURSE PRACTITIONER

## 2022-10-06 PROCEDURE — 86886 COOMBS TEST INDIRECT TITER: CPT

## 2022-10-06 PROCEDURE — 86900 BLOOD TYPING SEROLOGIC ABO: CPT

## 2022-10-06 PROCEDURE — 86850 RBC ANTIBODY SCREEN: CPT

## 2022-10-06 PROCEDURE — 86870 RBC ANTIBODY IDENTIFICATION: CPT

## 2022-10-06 PROCEDURE — 36415 COLL VENOUS BLD VENIPUNCTURE: CPT

## 2022-10-06 PROCEDURE — 86905 BLOOD TYPING RBC ANTIGENS: CPT

## 2022-10-06 RX ORDER — THIAMINE HCL 100 MG
TABLET ORAL
COMMUNITY
End: 2022-11-04

## 2022-10-06 NOTE — PROGRESS NOTES
"CC: Prenatal visit    Jazmine Liang is a 32 y.o.  at 37w1d.  Doing well.  Denies contractions, LOF, or VB.  Reports good FM.  I spoke with Dr. Méndez.  The plan is to draw type and screen lab today and do phenotyping on donor blood to get best match.  Lab will hold 2 units of blood until after she delivers.      /66   Wt 75.8 kg (167 lb)   LMP 2022 (Approximate)   BMI 27.79 kg/m²   SVE: 1.5/30/-3          3/22 Problems (from 22 to present)     Problem Noted Resolved    Laxity of left hip 2022 by Diana Obrien MD No    Overview Signed 2022 12:49 PM by Diana Obrien MD     Seeing Rajani during pregnancy  Delivery recommendations: \"Extreme ER of LLE may be a precaution for you all to consider. Suggested the thought of sidelying delivery to avoid risk of subluxation and protection of LLE.  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  at 37w1d.  - RTC in 1 weeks  - Reviewed COVID-19 visitation policy  - Reviewed COVID-19 precautions  - L/D delivery packet given  - Schedule IOL at 39 weeks, Radha Gallardo CSA will call with " date/time     Diagnosis Plan   1. 37 weeks gestation of pregnancy     2. 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 Supervision of high risk pregnancy in third trimester     4. GBS bacteriuria     5. Laxity of left hip       I attest and reviewed note documented per CLAU Fernandez.       Scribed for CLAU Farris by @Alysia Paz@. 10/06/2022  10:43 CDT    CLAU Fernandez  10/6/2022  10:43 CDT

## 2022-10-07 LAB — A AB TITR SERPL: NORMAL {TITER}

## 2022-10-08 LAB
ANTI-E: NORMAL
ANTI-FYB: NORMAL
BIG S ANTIGEN: POSITIVE
C AG RBC QL: POSITIVE
DUFFY A ANTIGEN: POSITIVE
DUFFY B ANTIGEN: NEGATIVE
E AG RBC QL: NEGATIVE
KIDD A ANTIGEN: POSITIVE
KIDD B ANTIGEN: POSITIVE
LITTLE C: POSITIVE
LITTLE E: POSITIVE
LITTLE S ANTIGEN: POSITIVE
M ANTIGEN: NEGATIVE

## 2022-10-12 ENCOUNTER — DOCUMENTATION (OUTPATIENT)
Dept: OBSTETRICS AND GYNECOLOGY | Facility: CLINIC | Age: 32
End: 2022-10-12

## 2022-10-12 NOTE — PROGRESS NOTES
Dr. Grayson called. Blood bank is holding 3 units of best matched blood for patient till delivery.

## 2022-10-14 ENCOUNTER — ROUTINE PRENATAL (OUTPATIENT)
Dept: OBSTETRICS AND GYNECOLOGY | Facility: CLINIC | Age: 32
End: 2022-10-14

## 2022-10-14 VITALS — BODY MASS INDEX: 28.39 KG/M2 | WEIGHT: 170.6 LBS | SYSTOLIC BLOOD PRESSURE: 112 MMHG | DIASTOLIC BLOOD PRESSURE: 72 MMHG

## 2022-10-14 DIAGNOSIS — O09.93 SUPERVISION OF HIGH RISK PREGNANCY IN THIRD TRIMESTER: Primary | ICD-10-CM

## 2022-10-14 DIAGNOSIS — Z3A.38 38 WEEKS GESTATION OF PREGNANCY: ICD-10-CM

## 2022-10-14 DIAGNOSIS — O36.1930 MATERNAL RED CELL ALLOIMMUNIZATION IN THIRD TRIMESTER, ANTEPARTUM, NOT APPLICABLE OR UNSPECIFIED FETUS: ICD-10-CM

## 2022-10-14 DIAGNOSIS — M25.252 LAXITY OF LEFT HIP: ICD-10-CM

## 2022-10-14 DIAGNOSIS — R82.71 GBS BACTERIURIA: ICD-10-CM

## 2022-10-14 PROCEDURE — 99212 OFFICE O/P EST SF 10 MIN: CPT | Performed by: OBSTETRICS & GYNECOLOGY

## 2022-10-14 RX ORDER — SODIUM CHLORIDE 0.9 % (FLUSH) 0.9 %
3 SYRINGE (ML) INJECTION EVERY 12 HOURS SCHEDULED
Status: CANCELLED | OUTPATIENT
Start: 2022-10-14

## 2022-10-14 RX ORDER — PROMETHAZINE HYDROCHLORIDE 25 MG/1
25 TABLET ORAL EVERY 6 HOURS PRN
Status: CANCELLED | OUTPATIENT
Start: 2022-10-14

## 2022-10-14 RX ORDER — METHYLERGONOVINE MALEATE 0.2 MG/ML
200 INJECTION INTRAVENOUS ONCE AS NEEDED
Status: CANCELLED | OUTPATIENT
Start: 2022-10-14

## 2022-10-14 RX ORDER — PROMETHAZINE HYDROCHLORIDE 25 MG/1
12.5 SUPPOSITORY RECTAL EVERY 6 HOURS PRN
Status: CANCELLED | OUTPATIENT
Start: 2022-10-14

## 2022-10-14 RX ORDER — BUTORPHANOL TARTRATE 1 MG/ML
2 INJECTION, SOLUTION INTRAMUSCULAR; INTRAVENOUS
Status: CANCELLED | OUTPATIENT
Start: 2022-10-14

## 2022-10-14 RX ORDER — SODIUM CHLORIDE, SODIUM LACTATE, POTASSIUM CHLORIDE, CALCIUM CHLORIDE 600; 310; 30; 20 MG/100ML; MG/100ML; MG/100ML; MG/100ML
125 INJECTION, SOLUTION INTRAVENOUS CONTINUOUS
Status: CANCELLED | OUTPATIENT
Start: 2022-10-14

## 2022-10-14 RX ORDER — MISOPROSTOL 100 UG/1
800 TABLET ORAL AS NEEDED
Status: CANCELLED | OUTPATIENT
Start: 2022-10-14

## 2022-10-14 RX ORDER — LIDOCAINE HYDROCHLORIDE 10 MG/ML
5 INJECTION, SOLUTION EPIDURAL; INFILTRATION; INTRACAUDAL; PERINEURAL AS NEEDED
Status: CANCELLED | OUTPATIENT
Start: 2022-10-14

## 2022-10-14 RX ORDER — OXYTOCIN 10 [USP'U]/ML
2-20 INJECTION, SOLUTION INTRAMUSCULAR; INTRAVENOUS
Status: CANCELLED | OUTPATIENT
Start: 2022-10-14

## 2022-10-14 RX ORDER — ONDANSETRON 2 MG/ML
4 INJECTION INTRAMUSCULAR; INTRAVENOUS EVERY 6 HOURS PRN
Status: CANCELLED | OUTPATIENT
Start: 2022-10-14

## 2022-10-14 RX ORDER — IBUPROFEN 200 MG
800 TABLET ORAL EVERY 8 HOURS
Status: CANCELLED | OUTPATIENT
Start: 2022-10-14

## 2022-10-14 RX ORDER — ONDANSETRON 4 MG/1
4 TABLET, FILM COATED ORAL EVERY 6 HOURS PRN
Status: CANCELLED | OUTPATIENT
Start: 2022-10-14

## 2022-10-14 RX ORDER — SODIUM CHLORIDE 0.9 % (FLUSH) 0.9 %
3-10 SYRINGE (ML) INJECTION AS NEEDED
Status: CANCELLED | OUTPATIENT
Start: 2022-10-14

## 2022-10-14 RX ORDER — CARBOPROST TROMETHAMINE 250 UG/ML
250 INJECTION, SOLUTION INTRAMUSCULAR AS NEEDED
Status: CANCELLED | OUTPATIENT
Start: 2022-10-14

## 2022-10-14 RX ORDER — BUTORPHANOL TARTRATE 1 MG/ML
1 INJECTION, SOLUTION INTRAMUSCULAR; INTRAVENOUS
Status: CANCELLED | OUTPATIENT
Start: 2022-10-14

## 2022-10-14 RX ORDER — ACETAMINOPHEN 325 MG/1
1000 TABLET ORAL EVERY 6 HOURS
Status: CANCELLED | OUTPATIENT
Start: 2022-10-14

## 2022-10-14 RX ORDER — OXYTOCIN 10 [USP'U]/ML
10 INJECTION, SOLUTION INTRAMUSCULAR; INTRAVENOUS ONCE
Status: CANCELLED | OUTPATIENT
Start: 2022-10-14 | End: 2022-10-14

## 2022-10-14 NOTE — PROGRESS NOTES
"CC: Prenatal visit    Jazmine Liang is a 32 y.o.  at 38w2d.  Doing well.  Denies contractions, LOF, or VB.  Reports good FM.    /72   Wt 77.4 kg (170 lb 9.6 oz)   LMP 2022 (Approximate)   BMI 28.39 kg/m²   SVE: 4/60/-3/soft/posterior, vertex     Fetal Heart Rate: 145    3/22 Problems (from 22 to present)     Problem Noted Resolved    Laxity of left hip 2022 by Diana Obrien MD No    Overview Signed 2022 12:49 PM by Diana Obrien MD     Seeing Rajani during pregnancy  Delivery recommendations: \"Extreme ER of LLE may be a precaution for you all to consider. Suggested the thought of sidelying delivery to avoid risk of subluxation and protection of LLE.  I have recommended her to consider ortho consult post delivery to rule out labral insufficiency vs tear due to continual complaints.\"         Supervision of high risk pregnancy in third trimester 3/23/2022 by Delmi Bynum APRN No    Maternal red cell alloimmunization in third trimester, antepartum, not applicable or unspecified fetus 3/23/2022 by Delmi Bynum APRN No    Overview Signed 2022  1:39 PM by Diana Obrien MD     S/p TSP MFM consult; declined amniocentesis  Weekly MCA dopplers  Weekly BPP/DORI starting at 32 weeks  Delivery at 39 wks         GBS bacteriuria 3/23/2022 by Delmi Bynum APRN No    Nausea and vomiting during pregnancy prior to 22 weeks gestation 3/23/2022 by Delmi Bynum APRN 2022 by Delmi Bynum APRN        A/P: Jazmine Liang is a 32 y.o.  at 38w2d.  - IOL scheduled for 10/20  - Reviewed COVID-19 visitation policy  - Reviewed COVID-19 precautions     Diagnosis Plan   1. Supervision of high risk pregnancy in third trimester        2. Maternal red cell alloimmunization in third trimester, antepartum, not applicable or unspecified fetus        3. GBS bacteriuria      "   4. Laxity of left hip        5. 38 weeks gestation of pregnancy          Diana Obrien MD  10/14/2022  09:21 CDT

## 2022-10-14 NOTE — H&P
[facility]  HISTORY & PHYSICAL - Obstetrics    Name: Jazmine Liang  MRN: [id number]  Location: Room/bed info not found  Date: 10/14/2022   CSN: [id number]      CHIEF COMPLAINT:  IOL for alloimmunization    HISTORY OF PRESENT ILLNESS  Jazmine Liang is a 32 y.o.  at 38w2d who presents for RPN.  She is scheduled for IOL at 39 weeks secondary to maternal alloimmunization.  Denies LOF, vaginal bleeding, or contraction.  Reports good FM.    Patient denies any chest pain, palpitations, headaches, lightheadedness, shortness of breath, cough, nausea, vomiting, diarrhea, constipation, fever, or chills.    ROS  Review of Systems   Constitutional: Negative.    HENT: Negative.    Eyes: Negative.    Respiratory: Negative.    Cardiovascular: Negative.    Gastrointestinal: Negative.    Endocrine: Negative.    Genitourinary: Negative.    Musculoskeletal: Negative.    Skin: Negative.    Allergic/Immunologic: Negative.    Neurological: Negative.    Hematological: Negative.    Psychiatric/Behavioral: Negative.      PRENATAL LAB RESULTS  Prenatal labs reviewed  External Prenatal Results     Pregnancy Outside Results - Transcribed From Office Records - See Scanned Records For Details     Test Value Date Time    ABO  A  10/06/22 1054    Rh  Positive  10/06/22 1054    Antibody Screen  Positive  10/06/22 1054       Positive  22 0854    Varicella IgG       Rubella  4.87 index 22 0854    Hgb  12.3 g/dL 22 0954       13.6 g/dL 22 0854    Hct  36.1 % 22 0954       39.9 % 22 0854    Glucose Fasting GTT       Glucose Tolerance Test 1 hour       Glucose Tolerance Test 3 hour       Gonorrhea (discrete)  Negative  22 0854    Chlamydia (discrete)  Negative  22 0854    RPR  Non-Reactive  22 0854    VDRL       Syphilis Antibody       HBsAg  Non-Reactive  22 0854    Herpes Simplex Virus PCR       Herpes Simplex VIrus Culture       HIV  Non-Reactive   "03/14/22 0854    Hep C RNA Quant PCR       Hep C Antibody  Non-Reactive  03/14/22 0854    AFP  31.6 ng/mL 05/26/15 0935    Group B Strep       GBS Susceptibility to Clindamycin       GBS Susceptibility to Erythromycin       Fetal Fibronectin       Genetic Testing, Maternal Blood             Drug Screening     Test Value Date Time    Urine Drug Screen       Amphetamine Screen  Negative  03/14/22 0854    Barbiturate Screen  Negative  03/14/22 0854    Benzodiazepine Screen  Negative  03/14/22 0854    Methadone Screen  Negative  03/14/22 0854    Phencyclidine Screen  Negative  03/14/22 0854    Opiates Screen  Negative  03/14/22 0854    THC Screen  Negative  03/14/22 0854    Cocaine Screen       Propoxyphene Screen  Negative  03/14/22 0854    Buprenorphine Screen  Negative  03/14/22 0854    Methamphetamine Screen       Oxycodone Screen  Negative  03/14/22 0854    Tricyclic Antidepressants Screen  Negative  03/14/22 0854          Legend    ^: Historical                      PRENATAL RISK FACTORS  3/22 Problems (from 03/14/22 to present)     Problem Noted Resolved    Laxity of left hip 8/20/2022 by Diana Obrien MD No    Overview Signed 8/20/2022 12:49 PM by Diana Obrien MD     Seeing Rajani during pregnancy  Delivery recommendations: \"Extreme ER of LLE may be a precaution for you all to consider. Suggested the thought of sidelying delivery to avoid risk of subluxation and protection of LLE.  I have recommended her to consider ortho consult post delivery to rule out labral insufficiency vs tear due to continual complaints.\"         Supervision of high risk pregnancy in third trimester 3/23/2022 by Delmi Bynum, CLAU No    Maternal red cell alloimmunization in third trimester, antepartum, not applicable or unspecified fetus 3/23/2022 by Delmi Bynum APRN No    Overview Signed 6/1/2022  1:39 PM by Diana Obrien MD     S/p TSP MF consult; declined " amniocentesis  Weekly MCA dopplers  Weekly BPP/DORI starting at 32 weeks  Delivery at 39 wks         GBS bacteriuria 3/23/2022 by Delmi Bynum APRN No    Nausea and vomiting during pregnancy prior to 22 weeks gestation 3/23/2022 by Delmi Bynum APRN 2022 by Delmi Bynum APRN        OB HISTORY  OB History    Para Term  AB Living   3 2 2     2   SAB IAB Ectopic Molar Multiple Live Births             2      # Outcome Date GA Lbr Berry/2nd Weight Sex Delivery Anes PTL Lv   3 Current            2 Term 10/29/15 38w4d  3572 g (7 lb 14 oz) M Vag-Spont EPI N SULEMAN      Birth Comments: also complicated by multiple maternal antibodies; most significant being E and FYB      Complications: Postpartum hemorrhage, Positive GBS test   1 Term 13 39w6d  3771 g (8 lb 5 oz) M Vag-Spont EPI N SULEMAN     GYN HISTORY  Denies h/o sexually transmitted infections/pelvic inflammatory disease  Denies h/o gynecologic surgeries, including biopsies of the cervix    PAST MEDICAL HISTORY  Past Medical History:   Diagnosis Date   • Hip dysplasia, congenital    • History of postpartum depression    • History of transfusion    • Maternal care for rhesus isoimmunization, third trimester, delivered     maternal care for other isoimmunization, third triemster, not applicable or unspecified   • Varicella      PAST SURGICAL HISTORY  Past Surgical History:   Procedure Laterality Date   • TONSILLECTOMY     • WISDOM TOOTH EXTRACTION       FAMILY HISTORY  Family History   Problem Relation Age of Onset   • No Known Problems Father    • No Known Problems Mother    • No Known Problems Brother    • No Known Problems Son    • No Known Problems Son    • Hypertension Paternal Grandfather    • Hypertension Paternal Grandmother    • Diabetes Paternal Grandmother    • Pancreatic cancer Paternal Grandmother    • No Known Problems Maternal Grandmother    • No Known Problems Maternal Grandfather    • Diabetes  Other    • Hypertension Other      SOCIAL HISTORY  Social History     Socioeconomic History   • Marital status:    Tobacco Use   • Smoking status: Never   • Smokeless tobacco: Never   Vaping Use   • Vaping Use: Never used   Substance and Sexual Activity   • Alcohol use: No   • Drug use: No   • Sexual activity: Yes     Partners: Male     Comment: last pap smear 10/18/18 negative     ALLERGIES  Allergies   Allergen Reactions   • Bactrim [Sulfamethoxazole-Trimethoprim] Rash   • Ceclor [Cefaclor] Rash     HOME MEDICATIONS  Prior to Admission medications    Medication Sig Start Date End Date Taking? Authorizing Provider   Magnesium 500 MG tablet Take  by mouth.   Yes Provider, MD Bull   prenatal vitamin (prenatal, CLASSIC, vitamin) tablet Take  by mouth Daily.   Yes Provider, MD Bull     PHYSICAL EXAM  /72   Wt 77.4 kg (170 lb 9.6 oz)   LMP 2022 (Approximate)   BMI 28.39 kg/m²   General: No acute distress. Well developed, well nourished. Pleasant.  Heart: Regular rate and rhythm. No murmurs, rubs, or gallops  Lungs: Clear to auscultation bilaterally. No wheezes, rales, or rhonchi.  Abdomen: Soft, nontender to palpation, enlarged by gravid uterus.    SVE: 4/ 60/ -3/ soft/ posterior, vertex    IMPRESSION  Jazmine Liang is a 32 y.o.  scheduled for IOL at 39w1d secondary to alloimmunization; cervix is favorable so will induce with pitocin.    PLAN  1.  IUP at 39w1d with alloimmunization  - Admit: Labor and Delivery  - Attending: Dr. Obrien  - Condition: Stable  - Vitals: per protocol  - Activity: ad zac  - Nursing: Continuous electronic fetal monitoring, as per protocol  - Diet: Clears  - IV fluids:  mL/hr  - Meds: Pitocin  - Allergies: Bactrim, Ceclor  - Labs: CBC, T&S, UDS  - GBS: POS (urine).  Antibiotics: PCN  - Jazmine Liang and I have discussed pain goals for this hospitalization after reviewing her current clinical condition, medical history and prior pain  experiences.  The goal is to keep her pain level appropriate.  Patient considering an epidural.  - Anticipate     This document has been electronically signed by Diana Obrien MD on 2022 09:23 CDT.

## 2022-10-20 ENCOUNTER — HOSPITAL ENCOUNTER (INPATIENT)
Facility: HOSPITAL | Age: 32
LOS: 1 days | Discharge: HOME | End: 2022-10-21
Attending: OBSTETRICS & GYNECOLOGY | Admitting: OBSTETRICS & GYNECOLOGY

## 2022-10-20 ENCOUNTER — HOSPITAL ENCOUNTER (INPATIENT)
Dept: LABOR AND DELIVERY | Facility: HOSPITAL | Age: 32
Discharge: HOME | End: 2022-10-20

## 2022-10-20 DIAGNOSIS — O09.93 SUPERVISION OF HIGH RISK PREGNANCY IN THIRD TRIMESTER: ICD-10-CM

## 2022-10-20 PROBLEM — E16.2 HYPOGLYCEMIA: Status: RESOLVED | Noted: 2020-02-28 | Resolved: 2022-10-20

## 2022-10-20 LAB
ABO GROUP BLD: NORMAL
AMPHET+METHAMPHET UR QL: NEGATIVE
AMPHETAMINES UR QL: NEGATIVE
ANTI-E: NORMAL
ANTI-FYB: NORMAL
BARBITURATES UR QL SCN: NEGATIVE
BENZODIAZ UR QL SCN: NEGATIVE
BLD GP AB SCN SERPL QL: POSITIVE
BUPRENORPHINE SERPL-MCNC: NEGATIVE NG/ML
CANNABINOIDS SERPL QL: NEGATIVE
COCAINE UR QL: NEGATIVE
DEPRECATED RDW RBC AUTO: 42.5 FL (ref 37–54)
ERYTHROCYTE [DISTWIDTH] IN BLOOD BY AUTOMATED COUNT: 13.2 % (ref 12.3–15.4)
HCT VFR BLD AUTO: 33.9 % (ref 34–46.6)
HGB BLD-MCNC: 12.1 G/DL (ref 12–15.9)
Lab: NORMAL
MCH RBC QN AUTO: 31.8 PG (ref 26.6–33)
MCHC RBC AUTO-ENTMCNC: 35.7 G/DL (ref 31.5–35.7)
MCV RBC AUTO: 89 FL (ref 79–97)
METHADONE UR QL SCN: NEGATIVE
OPIATES UR QL: NEGATIVE
OXYCODONE UR QL SCN: NEGATIVE
PCP UR QL SCN: NEGATIVE
PLATELET # BLD AUTO: 177 10*3/MM3 (ref 140–450)
PMV BLD AUTO: 10.1 FL (ref 6–12)
PROPOXYPH UR QL: NEGATIVE
RBC # BLD AUTO: 3.81 10*6/MM3 (ref 3.77–5.28)
RH BLD: POSITIVE
T&S EXPIRATION DATE: NORMAL
TRICYCLICS UR QL SCN: NEGATIVE
WBC NRBC COR # BLD: 6.83 10*3/MM3 (ref 3.4–10.8)

## 2022-10-20 PROCEDURE — 86870 RBC ANTIBODY IDENTIFICATION: CPT | Performed by: OBSTETRICS & GYNECOLOGY

## 2022-10-20 PROCEDURE — 88307 TISSUE EXAM BY PATHOLOGIST: CPT

## 2022-10-20 PROCEDURE — 0 PENICILLIN G POTASSIUM PER 600000 UNITS: Performed by: OBSTETRICS & GYNECOLOGY

## 2022-10-20 PROCEDURE — 86901 BLOOD TYPING SEROLOGIC RH(D): CPT | Performed by: OBSTETRICS & GYNECOLOGY

## 2022-10-20 PROCEDURE — 0KQM0ZZ REPAIR PERINEUM MUSCLE, OPEN APPROACH: ICD-10-PCS | Performed by: OBSTETRICS & GYNECOLOGY

## 2022-10-20 PROCEDURE — 59409 OBSTETRICAL CARE: CPT | Performed by: OBSTETRICS & GYNECOLOGY

## 2022-10-20 PROCEDURE — 85027 COMPLETE CBC AUTOMATED: CPT | Performed by: OBSTETRICS & GYNECOLOGY

## 2022-10-20 PROCEDURE — 80306 DRUG TEST PRSMV INSTRMNT: CPT | Performed by: OBSTETRICS & GYNECOLOGY

## 2022-10-20 PROCEDURE — 86900 BLOOD TYPING SEROLOGIC ABO: CPT | Performed by: OBSTETRICS & GYNECOLOGY

## 2022-10-20 PROCEDURE — 86850 RBC ANTIBODY SCREEN: CPT | Performed by: OBSTETRICS & GYNECOLOGY

## 2022-10-20 RX ORDER — LIDOCAINE HYDROCHLORIDE 10 MG/ML
5 INJECTION, SOLUTION EPIDURAL; INFILTRATION; INTRACAUDAL; PERINEURAL ONCE
Status: COMPLETED | OUTPATIENT
Start: 2022-10-20 | End: 2022-10-20

## 2022-10-20 RX ORDER — ONDANSETRON 4 MG/1
4 TABLET, FILM COATED ORAL EVERY 6 HOURS PRN
Status: DISCONTINUED | OUTPATIENT
Start: 2022-10-20 | End: 2022-10-20 | Stop reason: HOSPADM

## 2022-10-20 RX ORDER — SODIUM CHLORIDE 0.9 % (FLUSH) 0.9 %
1-10 SYRINGE (ML) INJECTION AS NEEDED
Status: DISCONTINUED | OUTPATIENT
Start: 2022-10-20 | End: 2022-10-21 | Stop reason: HOSPADM

## 2022-10-20 RX ORDER — SODIUM CHLORIDE 0.9 % (FLUSH) 0.9 %
3-10 SYRINGE (ML) INJECTION AS NEEDED
Status: DISCONTINUED | OUTPATIENT
Start: 2022-10-20 | End: 2022-10-20 | Stop reason: HOSPADM

## 2022-10-20 RX ORDER — LIDOCAINE HYDROCHLORIDE 10 MG/ML
INJECTION, SOLUTION EPIDURAL; INFILTRATION; INTRACAUDAL; PERINEURAL
Status: DISPENSED
Start: 2022-10-20 | End: 2022-10-20

## 2022-10-20 RX ORDER — OXYTOCIN 10 [USP'U]/ML
10 INJECTION, SOLUTION INTRAMUSCULAR; INTRAVENOUS ONCE
Status: DISCONTINUED | OUTPATIENT
Start: 2022-10-20 | End: 2022-10-20 | Stop reason: HOSPADM

## 2022-10-20 RX ORDER — CALCIUM CARBONATE 200(500)MG
2 TABLET,CHEWABLE ORAL 3 TIMES DAILY PRN
Status: DISCONTINUED | OUTPATIENT
Start: 2022-10-20 | End: 2022-10-21 | Stop reason: HOSPADM

## 2022-10-20 RX ORDER — SODIUM CHLORIDE, SODIUM LACTATE, POTASSIUM CHLORIDE, CALCIUM CHLORIDE 600; 310; 30; 20 MG/100ML; MG/100ML; MG/100ML; MG/100ML
125 INJECTION, SOLUTION INTRAVENOUS CONTINUOUS
Status: DISCONTINUED | OUTPATIENT
Start: 2022-10-20 | End: 2022-10-21 | Stop reason: HOSPADM

## 2022-10-20 RX ORDER — OXYTOCIN/0.9 % SODIUM CHLORIDE 30/500 ML
2-20 PLASTIC BAG, INJECTION (ML) INTRAVENOUS
Status: DISCONTINUED | OUTPATIENT
Start: 2022-10-20 | End: 2022-10-20 | Stop reason: HOSPADM

## 2022-10-20 RX ORDER — IBUPROFEN 800 MG/1
800 TABLET ORAL EVERY 8 HOURS
Status: DISCONTINUED | OUTPATIENT
Start: 2022-10-20 | End: 2022-10-21 | Stop reason: HOSPADM

## 2022-10-20 RX ORDER — PROMETHAZINE HYDROCHLORIDE 25 MG/1
25 TABLET ORAL EVERY 6 HOURS PRN
Status: DISCONTINUED | OUTPATIENT
Start: 2022-10-20 | End: 2022-10-20 | Stop reason: HOSPADM

## 2022-10-20 RX ORDER — MISOPROSTOL 200 UG/1
800 TABLET ORAL AS NEEDED
Status: DISCONTINUED | OUTPATIENT
Start: 2022-10-20 | End: 2022-10-20 | Stop reason: HOSPADM

## 2022-10-20 RX ORDER — DOCUSATE SODIUM 100 MG/1
100 CAPSULE, LIQUID FILLED ORAL 2 TIMES DAILY
Status: DISCONTINUED | OUTPATIENT
Start: 2022-10-20 | End: 2022-10-21 | Stop reason: HOSPADM

## 2022-10-20 RX ORDER — LIDOCAINE HYDROCHLORIDE 10 MG/ML
5 INJECTION, SOLUTION EPIDURAL; INFILTRATION; INTRACAUDAL; PERINEURAL AS NEEDED
Status: DISCONTINUED | OUTPATIENT
Start: 2022-10-20 | End: 2022-10-20

## 2022-10-20 RX ORDER — ONDANSETRON 4 MG/1
4 TABLET, FILM COATED ORAL EVERY 6 HOURS PRN
Status: DISCONTINUED | OUTPATIENT
Start: 2022-10-20 | End: 2022-10-21 | Stop reason: HOSPADM

## 2022-10-20 RX ORDER — BUTORPHANOL TARTRATE 1 MG/ML
1 INJECTION, SOLUTION INTRAMUSCULAR; INTRAVENOUS
Status: DISCONTINUED | OUTPATIENT
Start: 2022-10-20 | End: 2022-10-20 | Stop reason: HOSPADM

## 2022-10-20 RX ORDER — ACETAMINOPHEN 500 MG
1000 TABLET ORAL EVERY 6 HOURS
Status: DISCONTINUED | OUTPATIENT
Start: 2022-10-20 | End: 2022-10-20 | Stop reason: HOSPADM

## 2022-10-20 RX ORDER — ONDANSETRON 2 MG/ML
4 INJECTION INTRAMUSCULAR; INTRAVENOUS EVERY 6 HOURS PRN
Status: DISCONTINUED | OUTPATIENT
Start: 2022-10-20 | End: 2022-10-20 | Stop reason: HOSPADM

## 2022-10-20 RX ORDER — HYDROCORTISONE 25 MG/G
1 CREAM TOPICAL 3 TIMES DAILY PRN
Status: DISCONTINUED | OUTPATIENT
Start: 2022-10-20 | End: 2022-10-21 | Stop reason: HOSPADM

## 2022-10-20 RX ORDER — CARBOPROST TROMETHAMINE 250 UG/ML
250 INJECTION, SOLUTION INTRAMUSCULAR AS NEEDED
Status: DISCONTINUED | OUTPATIENT
Start: 2022-10-20 | End: 2022-10-20 | Stop reason: HOSPADM

## 2022-10-20 RX ORDER — BISACODYL 10 MG
10 SUPPOSITORY, RECTAL RECTAL DAILY PRN
Status: DISCONTINUED | OUTPATIENT
Start: 2022-10-21 | End: 2022-10-21 | Stop reason: HOSPADM

## 2022-10-20 RX ORDER — IBUPROFEN 800 MG/1
800 TABLET ORAL EVERY 8 HOURS
Status: DISCONTINUED | OUTPATIENT
Start: 2022-10-20 | End: 2022-10-20 | Stop reason: HOSPADM

## 2022-10-20 RX ORDER — PRENATAL VIT/IRON FUM/FOLIC AC 27MG-0.8MG
1 TABLET ORAL DAILY
Status: DISCONTINUED | OUTPATIENT
Start: 2022-10-20 | End: 2022-10-21 | Stop reason: HOSPADM

## 2022-10-20 RX ORDER — ONDANSETRON 2 MG/ML
4 INJECTION INTRAMUSCULAR; INTRAVENOUS EVERY 6 HOURS PRN
Status: DISCONTINUED | OUTPATIENT
Start: 2022-10-20 | End: 2022-10-21 | Stop reason: HOSPADM

## 2022-10-20 RX ORDER — ACETAMINOPHEN 500 MG
1000 TABLET ORAL EVERY 6 HOURS
Status: DISCONTINUED | OUTPATIENT
Start: 2022-10-20 | End: 2022-10-21 | Stop reason: HOSPADM

## 2022-10-20 RX ORDER — FERROUS SULFATE TAB EC 324 MG (65 MG FE EQUIVALENT) 324 (65 FE) MG
324 TABLET DELAYED RESPONSE ORAL 2 TIMES DAILY WITH MEALS
Status: DISCONTINUED | OUTPATIENT
Start: 2022-10-20 | End: 2022-10-21 | Stop reason: HOSPADM

## 2022-10-20 RX ORDER — SODIUM CHLORIDE 0.9 % (FLUSH) 0.9 %
3 SYRINGE (ML) INJECTION EVERY 12 HOURS SCHEDULED
Status: DISCONTINUED | OUTPATIENT
Start: 2022-10-20 | End: 2022-10-20 | Stop reason: HOSPADM

## 2022-10-20 RX ORDER — PROMETHAZINE HYDROCHLORIDE 12.5 MG/1
12.5 SUPPOSITORY RECTAL EVERY 6 HOURS PRN
Status: DISCONTINUED | OUTPATIENT
Start: 2022-10-20 | End: 2022-10-20 | Stop reason: HOSPADM

## 2022-10-20 RX ORDER — METHYLERGONOVINE MALEATE 0.2 MG/ML
200 INJECTION INTRAVENOUS ONCE AS NEEDED
Status: DISCONTINUED | OUTPATIENT
Start: 2022-10-20 | End: 2022-10-20 | Stop reason: HOSPADM

## 2022-10-20 RX ORDER — SODIUM CHLORIDE, SODIUM LACTATE, POTASSIUM CHLORIDE, CALCIUM CHLORIDE 600; 310; 30; 20 MG/100ML; MG/100ML; MG/100ML; MG/100ML
INJECTION, SOLUTION INTRAVENOUS
Status: COMPLETED
Start: 2022-10-20 | End: 2022-10-20

## 2022-10-20 RX ADMIN — Medication 1 TABLET: at 14:51

## 2022-10-20 RX ADMIN — DOCUSATE SODIUM 100 MG: 100 CAPSULE, LIQUID FILLED ORAL at 14:51

## 2022-10-20 RX ADMIN — Medication: at 10:14

## 2022-10-20 RX ADMIN — LIDOCAINE HYDROCHLORIDE 5 ML: 10 INJECTION, SOLUTION EPIDURAL; INFILTRATION; INTRACAUDAL; PERINEURAL at 09:22

## 2022-10-20 RX ADMIN — IBUPROFEN 800 MG: 800 TABLET, FILM COATED ORAL at 18:12

## 2022-10-20 RX ADMIN — FERROUS SULFATE TAB EC 324 MG (65 MG FE EQUIVALENT) 324 MG: 324 (65 FE) TABLET DELAYED RESPONSE at 19:18

## 2022-10-20 RX ADMIN — SODIUM CHLORIDE, POTASSIUM CHLORIDE, SODIUM LACTATE AND CALCIUM CHLORIDE 125 ML/HR: 600; 310; 30; 20 INJECTION, SOLUTION INTRAVENOUS at 05:47

## 2022-10-20 RX ADMIN — SODIUM CHLORIDE 5 MILLION UNITS: 9 INJECTION, SOLUTION INTRAVENOUS at 06:54

## 2022-10-20 RX ADMIN — SODIUM CHLORIDE, SODIUM LACTATE, POTASSIUM CHLORIDE, CALCIUM CHLORIDE 125 ML/HR: 600; 310; 30; 20 INJECTION, SOLUTION INTRAVENOUS at 05:47

## 2022-10-20 RX ADMIN — ACETAMINOPHEN 1000 MG: 500 TABLET ORAL at 10:14

## 2022-10-20 RX ADMIN — OXYTOCIN-SODIUM CHLORIDE 0.9% IV SOLN 30 UNIT/500ML 2 MILLI-UNITS/MIN: 30-0.9/5 SOLUTION at 06:15

## 2022-10-20 RX ADMIN — ACETAMINOPHEN 1000 MG: 500 TABLET ORAL at 22:47

## 2022-10-20 RX ADMIN — IBUPROFEN 800 MG: 800 TABLET, FILM COATED ORAL at 10:13

## 2022-10-20 RX ADMIN — DOCUSATE SODIUM 100 MG: 100 CAPSULE, LIQUID FILLED ORAL at 22:08

## 2022-10-20 RX ADMIN — ACETAMINOPHEN 1000 MG: 500 TABLET ORAL at 14:51

## 2022-10-21 VITALS
OXYGEN SATURATION: 98 % | SYSTOLIC BLOOD PRESSURE: 101 MMHG | TEMPERATURE: 97.5 F | RESPIRATION RATE: 18 BRPM | DIASTOLIC BLOOD PRESSURE: 57 MMHG | HEART RATE: 81 BPM

## 2022-10-21 PROCEDURE — 99238 HOSP IP/OBS DSCHRG MGMT 30/<: CPT | Performed by: OBSTETRICS & GYNECOLOGY

## 2022-10-21 RX ORDER — ACETAMINOPHEN 500 MG
1000 TABLET ORAL EVERY 6 HOURS PRN
Qty: 30 TABLET | Refills: 1 | Status: SHIPPED | OUTPATIENT
Start: 2022-10-21

## 2022-10-21 RX ORDER — IBUPROFEN 800 MG/1
800 TABLET ORAL EVERY 8 HOURS PRN
Qty: 30 TABLET | Refills: 1 | Status: SHIPPED | OUTPATIENT
Start: 2022-10-21 | End: 2022-11-03 | Stop reason: ALTCHOICE

## 2022-10-21 RX ADMIN — IBUPROFEN 800 MG: 800 TABLET, FILM COATED ORAL at 01:59

## 2022-10-21 RX ADMIN — ACETAMINOPHEN 1000 MG: 500 TABLET ORAL at 05:12

## 2022-10-21 RX ADMIN — Medication 1 TABLET: at 08:31

## 2022-10-21 RX ADMIN — ACETAMINOPHEN 1000 MG: 500 TABLET ORAL at 08:31

## 2022-10-21 RX ADMIN — ACETAMINOPHEN 1000 MG: 500 TABLET ORAL at 15:23

## 2022-10-21 RX ADMIN — FERROUS SULFATE TAB EC 324 MG (65 MG FE EQUIVALENT) 324 MG: 324 (65 FE) TABLET DELAYED RESPONSE at 08:31

## 2022-10-21 RX ADMIN — DOCUSATE SODIUM 100 MG: 100 CAPSULE, LIQUID FILLED ORAL at 08:31

## 2022-10-21 RX ADMIN — IBUPROFEN 800 MG: 800 TABLET, FILM COATED ORAL at 10:08

## 2022-10-24 LAB — REF LAB TEST METHOD: NORMAL

## 2022-10-26 DIAGNOSIS — M25.252 LAXITY OF LEFT HIP: Primary | ICD-10-CM

## 2022-11-01 DIAGNOSIS — M25.552 LEFT HIP PAIN: Primary | ICD-10-CM

## 2022-11-03 ENCOUNTER — OFFICE VISIT (OUTPATIENT)
Dept: ORTHOPEDIC SURGERY | Facility: CLINIC | Age: 32
End: 2022-11-03

## 2022-11-03 VITALS — HEIGHT: 65 IN | WEIGHT: 151.4 LBS | BODY MASS INDEX: 25.22 KG/M2

## 2022-11-03 DIAGNOSIS — G89.29 CHRONIC LEFT SI JOINT PAIN: ICD-10-CM

## 2022-11-03 DIAGNOSIS — M25.252 LAXITY OF LEFT HIP: Primary | ICD-10-CM

## 2022-11-03 DIAGNOSIS — M53.3 CHRONIC LEFT SI JOINT PAIN: ICD-10-CM

## 2022-11-03 DIAGNOSIS — M25.552 LEFT HIP PAIN: ICD-10-CM

## 2022-11-03 PROCEDURE — 99214 OFFICE O/P EST MOD 30 MIN: CPT | Performed by: NURSE PRACTITIONER

## 2022-11-03 RX ORDER — MELOXICAM 15 MG/1
15 TABLET ORAL DAILY
Qty: 14 TABLET | Refills: 0 | Status: SHIPPED | OUTPATIENT
Start: 2022-11-03 | End: 2022-11-04

## 2022-11-03 NOTE — PROGRESS NOTES
Jazmine Liang is a 32 y.o. female   Primary provider:  Provider, No Known       Chief Complaint   Patient presents with   • Left Hip - Pain       HISTORY OF PRESENT ILLNESS:    Mrs. Liang is a 32-year-old female who presents today with complaints of chronic left hip pain.  Left hip pain has been present for 9 years.  She reports subluxation of left hip.  She reports there are times when left hip seemingly pops out of place, when this happens she is unable to move the leg until she can situate herself to cause hip to spontaneously reduce.  She reports pain when rolling over in bed at night.  She has complaints that sound consistent with crepitus.  She describes pain as severe when it occurs.  Pain is intermittent and also described as grinding and aching.  Pain is in her anterior groin and SI joint.  She has no lateral hip pain.  She has no complaints of burning, tingling, numbness, nausea, vomiting.  She is 2 weeks postpartum.  She is not breast-feeding.  She reports hip pain started around the time she was pregnant with her first child.  She reports around age 12 a doctor telling her she had hip dysplasia.  She has been working with physical therapy without resolution of symptoms.  She is also had another stent of physical therapy in the past.      Pain  This is a chronic problem. The current episode started more than 1 year ago. The problem occurs intermittently. The problem has been gradually worsening. Associated symptoms include arthralgias and headaches. The symptoms are aggravated by walking and standing (Sitting). Treatments tried: Therapy  The treatment provided no relief.        CONCURRENT MEDICAL HISTORY:    Past Medical History:   Diagnosis Date   • Hip dysplasia, congenital    • History of postpartum depression    • History of transfusion    • Maternal care for rhesus isoimmunization, third trimester, delivered     maternal care for other isoimmunization, third triemster, not applicable or  unspecified   • Varicella        Allergies   Allergen Reactions   • Bactrim [Sulfamethoxazole-Trimethoprim] Rash   • Ceclor [Cefaclor] Rash         Current Outpatient Medications:   •  acetaminophen (TYLENOL) 500 MG tablet, Take 2 tablets by mouth Every 6 (Six) Hours As Needed for Mild Pain., Disp: 30 tablet, Rfl: 1  •  Magnesium 500 MG tablet, Take  by mouth., Disp: , Rfl:   •  meloxicam (Mobic) 15 MG tablet, Take 1 tablet by mouth Daily for 14 days., Disp: 14 tablet, Rfl: 0  •  prenatal vitamin (prenatal, CLASSIC, vitamin) tablet, Take  by mouth Daily., Disp: , Rfl:     Past Surgical History:   Procedure Laterality Date   • TONSILLECTOMY     • WISDOM TOOTH EXTRACTION         Family History   Problem Relation Age of Onset   • No Known Problems Father    • No Known Problems Mother    • No Known Problems Brother    • No Known Problems Son    • No Known Problems Son    • Hypertension Paternal Grandfather    • Hypertension Paternal Grandmother    • Diabetes Paternal Grandmother    • Pancreatic cancer Paternal Grandmother    • No Known Problems Maternal Grandmother    • No Known Problems Maternal Grandfather    • Diabetes Other    • Hypertension Other        Social History     Socioeconomic History   • Marital status:    Tobacco Use   • Smoking status: Never   • Smokeless tobacco: Never   Vaping Use   • Vaping Use: Never used   Substance and Sexual Activity   • Alcohol use: No   • Drug use: No   • Sexual activity: Yes     Partners: Male     Comment: last pap smear 10/18/18 negative        Review of Systems   Constitutional: Negative.    Eyes: Negative.    Respiratory: Negative.    Cardiovascular: Negative.    Gastrointestinal: Negative.    Endocrine: Negative.    Genitourinary: Negative.    Musculoskeletal: Positive for arthralgias.   Skin: Negative.    Allergic/Immunologic: Negative.    Neurological: Positive for headaches.   Hematological: Negative.    Psychiatric/Behavioral: Negative.        PHYSICAL  "EXAMINATION:       Ht 165.1 cm (65\")   Wt 68.7 kg (151 lb 6.4 oz)   LMP 01/24/2022 (Approximate)   BMI 25.19 kg/m²     Physical Exam  Vitals and nursing note reviewed.   Constitutional:       General: She is not in acute distress.     Appearance: She is well-developed. She is not toxic-appearing or diaphoretic.   HENT:      Head: Normocephalic.   Eyes:      General: No scleral icterus.  Pulmonary:      Effort: Pulmonary effort is normal. No respiratory distress.   Skin:     General: Skin is warm and dry.   Neurological:      Mental Status: She is alert and oriented to person, place, and time.   Psychiatric:         Behavior: Behavior normal.         Thought Content: Thought content normal.         Judgment: Judgment normal.         GAIT:     []  Normal  []  Antalgic    Assistive device: []  None  []  Walker     []  Crutches  []  Cane     []  Wheelchair  []  Stretcher    Right Hip Exam     Tenderness   The patient is experiencing no tenderness.     Range of Motion   The patient has normal right hip ROM.      Left Hip Exam     Tenderness   The patient is experiencing tenderness in the anterior.    Range of Motion   The patient has normal left hip ROM.    Tests   KAMI: negative  Fadir:  Negative FADIR test    Comments:  Normal range of motion however left hip is noticeably tighter than right hip.  No significant pain with internal or external rotation.                  US OB/GYN SHELL ORDER    Result Date: 10/17/2022  Narrative: This result has an attachment that is not available. Procedure: [id number] - Order #: [id number] - Accession #: [id number]     OB/GYN SHELL ORDER    Result Date: 10/10/2022  Narrative: This result has an attachment that is not available. Procedure: [id number] - Order #: [id number] - Accession #: [id number]          ASSESSMENT:    Diagnoses and all orders for this visit:    Laxity of left hip  -     MRI Pelvis Without Contrast; Future    Left hip pain  -     MRI Pelvis Without Contrast; " Future    Chronic left SI joint pain  -     MRI Pelvis Without Contrast; Future    Postpartum state  -     MRI Pelvis Without Contrast; Future    Other orders  -     meloxicam (Mobic) 15 MG tablet; Take 1 tablet by mouth Daily for 14 days.          PLAN    Patient has subjective complaints concerning for possible labrum tear, however patient denies injury or known trauma.  X-rays do not show acute bony abnormality or significant degenerative change.  No gross hip dysplasia noted.  Patient has tried and failed conservative management over the past 9 years including NSAIDs, physical therapy, activity modification.  She has recently been attempting physical therapy and can without relief of symptoms.  After discussing risk, benefits, alternatives, patient prescribed meloxicam.  Patient is to continue with RICE therapy and activity modification as needed.  Patient is to continue working with physical therapy.  Recommend proceeding with MRI for further investigation.  Patient is interested in surgical options, we will likely refer her to Dr. Luna in the future.    Return for MRI results .    EMR Dragon/Transciption Disclaimer: Some of this note may be an electronic transcription/translation of spoken language to printed text.  The electronic translation of spoken language may permit erroneous, or at times, nonsensical words or phrases to be inadvertently transcribed. Although I have reviewed the note for such errors, some may still exist.     Time spent of a minimum of 30 minutes including the face to face evaluation, reviewing of medical history and prior medial records, reviewing of diagnostic studies, ordering additional tests, documentation, patient education and coordination of care.         This document has been electronically signed by Britta OLGUIN on November 3, 2022 13:48 CDT

## 2022-11-04 ENCOUNTER — OFFICE VISIT (OUTPATIENT)
Dept: OBSTETRICS AND GYNECOLOGY | Facility: CLINIC | Age: 32
End: 2022-11-04

## 2022-11-04 PROCEDURE — 99441 PR PHYS/QHP TELEPHONE EVALUATION 5-10 MIN: CPT | Performed by: OBSTETRICS & GYNECOLOGY

## 2022-11-04 NOTE — PROGRESS NOTES
[facility]  Obstetrics  Date of Service: 2022    CC: Postpartum visit      Jazmine Liang is a 32 y.o.  s/p Vaginal, Spontaneous on 10/20/2022 at 39w1d secondary to IOL for alloimmunization who presents today for postpartum check.  The patient states that she is still having bright red bleeding with clots.  She also reports that her left side is extremely sore, all of the way from her side to her back.  She states that the pain is in the same spot as where her back is causing problems.  She has been to the chiropractor already.  Patient denies postpartum depression.  Bottlefeeding.  She has not resumed sexual intercourse.  Denies bladder issues.  States that she has been told by her  and mom told her that she looks pale; denies lightheadedness, dizziness, SOB.  Reports that she only has a BM if she takes Miralax.    PHYSICAL EXAM:    LMP 2022 (Approximate)   Breastfeeding No   Postpartum Depression Screening Questionnaire: 0    IMPRESSION/PLAN: Jazmine Liang is a 32 y.o.  s/p Vaginal, Spontaneous on 10/20.  Doing well.    - Recovered nicely from her delivery  - Contraception: Vasectomy  - Continue restrictions  - Will obtain labs and US next week to evaluate for retained POCs  - Recommend daily Miralax for 2 weeks then try to wean off  - RTC 4 weeks for final PP visit    This document has been electronically signed by Diana Obrien MD on 2022 11:56 CDT.    This visit has been rescheduled as a phone visit to comply with patient safety concerns in accordance with CDC recommendations.  Total time of discussion was 9 minutes.  The use of a telephone visit has been reviewed with the patient and verbal informed consent has been obtained.

## 2022-11-09 ENCOUNTER — LAB (OUTPATIENT)
Dept: LAB | Facility: HOSPITAL | Age: 32
End: 2022-11-09

## 2022-11-09 ENCOUNTER — HOSPITAL ENCOUNTER (OUTPATIENT)
Dept: MRI IMAGING | Facility: HOSPITAL | Age: 32
Discharge: HOME | End: 2022-11-09
Admitting: NURSE PRACTITIONER

## 2022-11-09 DIAGNOSIS — M25.252 LAXITY OF LEFT HIP: ICD-10-CM

## 2022-11-09 DIAGNOSIS — M53.3 CHRONIC LEFT SI JOINT PAIN: ICD-10-CM

## 2022-11-09 DIAGNOSIS — G89.29 CHRONIC LEFT SI JOINT PAIN: ICD-10-CM

## 2022-11-09 DIAGNOSIS — M25.552 LEFT HIP PAIN: ICD-10-CM

## 2022-11-09 LAB
DEPRECATED RDW RBC AUTO: 40.3 FL (ref 37–54)
ERYTHROCYTE [DISTWIDTH] IN BLOOD BY AUTOMATED COUNT: 12.5 % (ref 12.3–15.4)
HCG INTACT+B SERPL-ACNC: 3.38 MIU/ML
HCT VFR BLD AUTO: 31.9 % (ref 34–46.6)
HGB BLD-MCNC: 10.7 G/DL (ref 12–15.9)
MCH RBC QN AUTO: 29.8 PG (ref 26.6–33)
MCHC RBC AUTO-ENTMCNC: 33.5 G/DL (ref 31.5–35.7)
MCV RBC AUTO: 88.9 FL (ref 79–97)
PLATELET # BLD AUTO: 329 10*3/MM3 (ref 140–450)
PMV BLD AUTO: 9.2 FL (ref 6–12)
RBC # BLD AUTO: 3.59 10*6/MM3 (ref 3.77–5.28)
WBC NRBC COR # BLD: 5.95 10*3/MM3 (ref 3.4–10.8)

## 2022-11-09 PROCEDURE — 72195 MRI PELVIS W/O DYE: CPT

## 2022-11-09 PROCEDURE — 84702 CHORIONIC GONADOTROPIN TEST: CPT

## 2022-11-09 PROCEDURE — 85027 COMPLETE CBC AUTOMATED: CPT

## 2022-11-09 PROCEDURE — 36415 COLL VENOUS BLD VENIPUNCTURE: CPT

## 2022-11-17 ENCOUNTER — OFFICE VISIT (OUTPATIENT)
Dept: ORTHOPEDIC SURGERY | Facility: CLINIC | Age: 32
End: 2022-11-17

## 2022-11-17 VITALS — HEIGHT: 65 IN | BODY MASS INDEX: 24.99 KG/M2 | WEIGHT: 150 LBS

## 2022-11-17 DIAGNOSIS — M25.552 LEFT HIP PAIN: Primary | ICD-10-CM

## 2022-11-17 DIAGNOSIS — M25.352 INSTABILITY OF LEFT HIP JOINT: ICD-10-CM

## 2022-11-17 PROCEDURE — 99214 OFFICE O/P EST MOD 30 MIN: CPT | Performed by: NURSE PRACTITIONER

## 2022-11-17 NOTE — PROGRESS NOTES
"Jazmine Liang is a 32 y.o. female returns for     Chief Complaint   Patient presents with   • Pelvis - Follow-up       HISTORY OF PRESENT ILLNESS:      Mrs. Liang is a 32-year-old female who presents today to follow-up on MRI results.  Left hip pain has been present for 9 years.  She reports recurrent subluxation of left hip.  She reports since last being seen she has experienced a another episode of feeling as if her hip popped out of place.  She reports she had to maneuver her hip until she felt it pop back in.  She reports increased pain today with external and internal rotation.  Symptoms have seemingly worsened since last appointment.  Some of this is thought to possibly be from getting up and down with  at night.  She continues to deny burning, tingling, numbness, fever, nausea, vomiting.  No traumatic injuries.   She has recently been in physical therapy with no relief of symptoms.  She is also tried physical therapy a separate time in the past without results.  She has tried OTC medications, activity modification, RICE therapy without relief of symptoms.         CONCURRENT MEDICAL HISTORY:    The following portions of the patient's history were reviewed and updated as appropriate: allergies, current medications, past family history, past medical history, past social history, past surgical history and problem list.     ROS  No fevers or chills.  No chest pain or shortness of air.  No GI or  disturbances.  Left hip pain.  PHYSICAL EXAMINATION:       Ht 165.1 cm (65\")   Wt 68 kg (150 lb)   LMP 2022 (Approximate)   BMI 24.96 kg/m²     Physical Exam  Vitals and nursing note reviewed.   Constitutional:       General: She is not in acute distress.     Appearance: She is well-developed. She is not toxic-appearing.   HENT:      Head: Normocephalic.   Pulmonary:      Effort: Pulmonary effort is normal. No respiratory distress.   Skin:     General: Skin is warm and dry.   Neurological:      Mental " Status: She is alert and oriented to person, place, and time.   Psychiatric:         Behavior: Behavior normal.         Thought Content: Thought content normal.         Judgment: Judgment normal.         GAIT:     []  Normal  []  Antalgic    Assistive device: [x]  None  []  Walker     []  Crutches  []  Cane     []  Wheelchair  []  Stretcher    Left Hip Exam     Tenderness   The patient is experiencing tenderness in the anterior.    Tests   KAMI: positive  Fadir:  Positive FADIR test    Other   Erythema: absent  Sensation: normal  Pulse: present    Comments:  Pain and limitations with arc of motion.  Patient has increased tenderness today from previous exam.  Patient has pain on exam with external and internal rotation.              MRI Pelvis Without Contrast    Result Date: 11/9/2022  Narrative: MRI pelvis without contrast. HISTORY: Chronic hip pain. Left hip pain. Prior exam left hip, pelvis November 3, 2022. TECHNIQUE: Multiplanar multisequence noncontrast images of the pelvis are obtained. Normal right and left hips. No evidence of any edema fracture or avascular necrosis. No significant joint effusions. There is increased signal intensity left sacral ala anterior aspect. This may represent reactive stress changes, resolving contusion or early changes of an insufficiency fracture. The uterus is slightly enlarged, postpartum appearance. There are no masses. Several nabothian cysts are identified, incidental significance. Normal right and left ovaries. Numerous small physiological follicular cysts in both ovaries. (No follow-up is necessary). No fluid in the adnexa or cul-de-sac. Normal bladder.     Impression: Normal postpartum slightly enlarged uterus.  Subtle focus of bone edema left sacral ala. This may represent reactive stress changes, postpartum changes, resolving contusion or developing insufficiency fracture. MRI pelvis is otherwise unremarkable. Electronically signed by:  Spencer Ng MD  11/9/2022 1:45  PM CST Workstation: [id number]    US Pelvis Complete    Result Date: 11/8/2022  Narrative: Transabdominal pelvic ultrasound non-OB complete HISTORY: Postpartum bleeding. Transabdominal ultrasound of the pelvis was performed. COMPARISON: None. FINDINGS: Enlarged uterus compatible with patient's postpartum status. The uterus measures 12.56 cm in length by 6.4 cm AP by 9.46 cm transverse. Uterine volume 43.00 mL. No retained products of conception. Small amount of fluid in the endometrial cavity. 1.63 cm follicular cyst right ovary. Additional less than 1 cm follicular cysts right ovary. Right ovarian volume 9.67 mL. Less than 1 cm follicular cysts left ovary. Left ovarian by 6.13 mL.. No free fluid.     Impression: CONCLUSION: No retained products of conception. Small amount of fluid in the endometrial cavity. Normal left ovary. 1.63 cm follicular cyst right ovary. No ultrasound follow-up recommended for this cyst. [id number] Electronically signed by:  Kendrick Suarez MD  11/8/2022 11:52 AM CST Workstation: [id number]    XR Hip With or Without Pelvis 2 - 3 View Left    Result Date: 11/4/2022  Narrative: EXAM: XR HIP 2 OR MORE VIEWS UNILATERAL WITH PELVIS HISTORY: pain, M25.552 Pain in left hip. COMPARISON: None FINDINGS: Mineralization: Normal Bones: Mild diastasis of the pubic symphysis measuring 9 mm. Left hip joint space is maintained. No fracture or destructive osseous lesion. Soft tissues: Normal     Impression: Mild diastasis of the pubic symphysis. Electronically signed by:  Phillip Saunders DO  11/4/2022 7:53 AM CDT Workstation: [id number]            ASSESSMENT:    Diagnoses and all orders for this visit:    Left hip pain  -     Cancel: MRI Hip Left Arthrogram; Future  -     MRI Hip Left Arthrogram; Future    Instability of left hip joint  -     Cancel: MRI Hip Left Arthrogram; Future  -     MRI Hip Left Arthrogram; Future          PLAN    MRI results reviewed with patient and with Dr. Luna.  Dr. Luna reviewed MRI results  and notes that patient has borderline hip dysplasia.  There is concern for labral tears.  Orthopedic surgeon recommends proceeding with MRA patient is very agreeable to this.  Patient has tried a myriad of for further evaluation.  Conservative measures over the years which have been ineffective in controlling her symptoms.  She is interested in possible surgical intervention.  MRA ordered.  We will follow-up afterwards with results.  In interim patient to continue conservative management to help with pain.    Recommend the following:    -Rest and activity modification as tolerated and based on pain.  -Use of a cane or walker as needed for modified weightbearing off the affected hip.   -Gradual progression of weightbearing and activity as pain allows.   -Ice therapy to the affected hip to minimize pain/inflammation.   -Tylenol, Aleve or Ibuprofen as needed for pain/discomfort.       Return for MRA results .    EMR Dragon/Transciption Disclaimer: Some of this note may be an electronic transcription/translation of spoken language to printed text.  The electronic translation of spoken language may permit erroneous, or at times, nonsensical words or phrases to be inadvertently transcribed. Although I have reviewed the note for such errors, some may still exist.       Time spent of a minimum of 30 minutes including the face to face evaluation, reviewing of medical history and prior medial records, reviewing of diagnostic studies, ordering additional tests, documentation, patient education and coordination of care.       This document has been electronically signed by Britta OLGUIN on November 17, 2022 09:17 CST

## 2022-11-22 DIAGNOSIS — M25.552 LEFT HIP PAIN: Primary | ICD-10-CM

## 2022-11-22 DIAGNOSIS — M53.3 CHRONIC LEFT SI JOINT PAIN: ICD-10-CM

## 2022-11-22 DIAGNOSIS — G89.29 CHRONIC LEFT SI JOINT PAIN: ICD-10-CM

## 2022-11-22 DIAGNOSIS — M25.252 LAXITY OF LEFT HIP: ICD-10-CM

## 2022-11-22 DIAGNOSIS — M25.352 INSTABILITY OF LEFT HIP JOINT: ICD-10-CM

## 2022-12-09 ENCOUNTER — HOSPITAL ENCOUNTER (OUTPATIENT)
Dept: MRI IMAGING | Facility: HOSPITAL | Age: 32
Discharge: HOME | End: 2022-12-09

## 2022-12-09 ENCOUNTER — HOSPITAL ENCOUNTER (OUTPATIENT)
Dept: CT IMAGING | Facility: HOSPITAL | Age: 32
Discharge: HOME | End: 2022-12-09

## 2022-12-09 VITALS
RESPIRATION RATE: 18 BRPM | TEMPERATURE: 98 F | OXYGEN SATURATION: 99 % | HEART RATE: 69 BPM | DIASTOLIC BLOOD PRESSURE: 64 MMHG | SYSTOLIC BLOOD PRESSURE: 129 MMHG

## 2022-12-09 DIAGNOSIS — M25.552 LEFT HIP PAIN: ICD-10-CM

## 2022-12-09 DIAGNOSIS — M25.252 LAXITY OF LEFT HIP: ICD-10-CM

## 2022-12-09 DIAGNOSIS — M25.352 INSTABILITY OF LEFT HIP JOINT: ICD-10-CM

## 2022-12-09 DIAGNOSIS — G89.29 CHRONIC LEFT SI JOINT PAIN: ICD-10-CM

## 2022-12-09 DIAGNOSIS — M53.3 CHRONIC LEFT SI JOINT PAIN: ICD-10-CM

## 2022-12-09 PROCEDURE — 25010000002 IOPAMIDOL 61 % SOLUTION: Performed by: NURSE PRACTITIONER

## 2022-12-09 PROCEDURE — A9579 GAD-BASE MR CONTRAST NOS,1ML: HCPCS | Performed by: NURSE PRACTITIONER

## 2022-12-09 PROCEDURE — 77012 CT SCAN FOR NEEDLE BIOPSY: CPT

## 2022-12-09 PROCEDURE — 25010000002 GADOTERIDOL PER 1 ML: Performed by: NURSE PRACTITIONER

## 2022-12-09 PROCEDURE — 73722 MRI JOINT OF LWR EXTR W/DYE: CPT

## 2022-12-09 RX ADMIN — IOPAMIDOL 5 ML: 612 INJECTION, SOLUTION INTRAVENOUS at 13:11

## 2022-12-09 RX ADMIN — GADOTERIDOL 1 ML: 279.3 INJECTION, SOLUTION INTRAVENOUS at 13:22

## 2022-12-09 NOTE — PRE-PROCEDURE NOTE
Patient's history and physical exam were reviewed, and no changes were noted.  The risks and benefits of left hip steroid injection were discussed with the patient, and the patient had ample opportunity to ask questions.  Informed consent was obtained.

## 2022-12-12 ENCOUNTER — POSTPARTUM VISIT (OUTPATIENT)
Dept: OBSTETRICS AND GYNECOLOGY | Facility: CLINIC | Age: 32
End: 2022-12-12

## 2022-12-12 VITALS
HEIGHT: 65 IN | SYSTOLIC BLOOD PRESSURE: 106 MMHG | BODY MASS INDEX: 24.66 KG/M2 | DIASTOLIC BLOOD PRESSURE: 64 MMHG | WEIGHT: 148 LBS

## 2022-12-12 PROCEDURE — 99212 OFFICE O/P EST SF 10 MIN: CPT

## 2022-12-12 NOTE — PROGRESS NOTES
"Subjective   Chief Complaint   Patient presents with   • Postpartum Care     Jazmine Liang is a 32 y.o. year old  presenting to be seen for her postpartum visit.  She had a vaginal delivery.   Prenatal course was been complicated by alloimmunization in pregnancy. Had a precipitous delivery..    Since delivery she has not been sexually active.  She does not have concerns about post-partum blues/depression.   She is bottle feeding.  LMP: 2022    The following portions of the patient's history were reviewed and updated as appropriate:current medications and allergies    Social History    Tobacco Use      Smoking status: Never      Smokeless tobacco: Never    Review of Systems   Constitutional: Negative for appetite change, chills, fatigue and fever.   Respiratory: Negative for apnea, cough, choking, chest tightness, shortness of breath, wheezing and stridor.    Cardiovascular: Negative for chest pain, palpitations and leg swelling.   Gastrointestinal: Negative for abdominal pain, constipation, diarrhea and nausea.   Genitourinary: Negative for amenorrhea, breast discharge, breast lump, breast pain, decreased libido, decreased urine volume, difficulty urinating, dysuria, flank pain, frequency, genital sores, hematuria, menstrual problem, pelvic pain, pelvic pressure, urgency, urinary incontinence, vaginal bleeding, vaginal discharge and vaginal pain.        Objective   /64   Ht 165.1 cm (65\")   Wt 67.1 kg (148 lb)   LMP 2022 (Approximate)   Breastfeeding No   BMI 24.63 kg/m²     General:  well developed; well nourished  no acute distress   Abdomen: soft, non-tender; no masses  no umbilical or inguinal hernias are present  no hepato-splenomegaly   Pelvis: Not performed.            Diagnoses and all orders for this visit:    Postpartum follow-up        No orders of the defined types were placed in this encounter.    May return to pre-pregnancy activities.  Denies need for contraception. "  is going to have a vasectomy.   Declines to have pap smear today. Will scheduled at a later time.      This note was electronically signed.    CLAU Fernandez  December 12, 2022

## 2022-12-14 ENCOUNTER — TELEPHONE (OUTPATIENT)
Dept: ORTHOPEDIC SURGERY | Facility: CLINIC | Age: 32
End: 2022-12-14
Payer: MEDICAID

## 2022-12-21 ENCOUNTER — TELEPHONE (OUTPATIENT)
Dept: ORTHOPEDIC SURGERY | Facility: CLINIC | Age: 32
End: 2022-12-21

## 2022-12-21 NOTE — TELEPHONE ENCOUNTER
Patient called and MRA results discussed.  MRA results also discussed with Dr. Luna.  Patient is a possible candidate for hip arthroscopy.  Dr. Luna recommends patient return in 3 months for surgical consult.  Waiting this amount of time for consultation is due to recent childbirth.  Dr. Luna also recommends patient to focus on medial glute exercises, core, and pelvic floor exercises in interim.  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 Patient is to return in approximately 3 months for consultation.

## 2023-06-13 ENCOUNTER — TELEPHONE (OUTPATIENT)
Dept: OBSTETRICS AND GYNECOLOGY | Facility: CLINIC | Age: 33
End: 2023-06-13
Payer: MEDICAID

## 2023-06-13 NOTE — TELEPHONE ENCOUNTER
Called pt to confirm type of compression underwear worked best for her.  She used underwear works briefs while pregnant.